# Patient Record
Sex: FEMALE | Race: WHITE | Employment: FULL TIME | URBAN - METROPOLITAN AREA
[De-identification: names, ages, dates, MRNs, and addresses within clinical notes are randomized per-mention and may not be internally consistent; named-entity substitution may affect disease eponyms.]

---

## 2017-01-03 ENCOUNTER — OFFICE VISIT (OUTPATIENT)
Dept: ORTHOPEDIC SURGERY | Age: 42
End: 2017-01-03

## 2017-01-03 VITALS
WEIGHT: 293 LBS | BODY MASS INDEX: 41.02 KG/M2 | DIASTOLIC BLOOD PRESSURE: 93 MMHG | HEART RATE: 87 BPM | HEIGHT: 71 IN | SYSTOLIC BLOOD PRESSURE: 147 MMHG

## 2017-01-03 DIAGNOSIS — M19.019 ACROMIOCLAVICULAR JOINT ARTHRITIS: Primary | ICD-10-CM

## 2017-01-03 PROCEDURE — 99213 OFFICE O/P EST LOW 20 MIN: CPT | Performed by: ORTHOPAEDIC SURGERY

## 2017-01-03 RX ORDER — TRAMADOL HYDROCHLORIDE 50 MG/1
50 TABLET ORAL EVERY 6 HOURS PRN
COMMUNITY
Start: 2016-11-21 | End: 2017-01-19 | Stop reason: HOSPADM

## 2017-01-16 ENCOUNTER — HOSPITAL ENCOUNTER (OUTPATIENT)
Dept: PREADMISSION TESTING | Age: 42
Discharge: HOME | End: 2017-01-16
Attending: ORTHOPAEDIC SURGERY | Admitting: ORTHOPAEDIC SURGERY

## 2017-01-16 VITALS — BODY MASS INDEX: 41.02 KG/M2 | WEIGHT: 293 LBS | HEIGHT: 71 IN

## 2017-01-16 RX ORDER — ZIPRASIDONE HYDROCHLORIDE 80 MG/1
80 CAPSULE ORAL 2 TIMES DAILY WITH MEALS
COMMUNITY
End: 2017-01-16

## 2017-01-16 RX ORDER — HYDROCHLOROTHIAZIDE 25 MG/1
25 TABLET ORAL DAILY
Status: ON HOLD | COMMUNITY
End: 2018-11-01

## 2017-01-16 RX ORDER — CHLORHEXIDINE GLUCONATE 0.12 MG/ML
15 RINSE ORAL 2 TIMES DAILY
Status: CANCELLED | OUTPATIENT
Start: 2017-01-16

## 2017-01-16 RX ORDER — M-VIT,TX,IRON,MINS/CALC/FOLIC 27MG-0.4MG
1 TABLET ORAL DAILY
COMMUNITY

## 2017-01-16 RX ORDER — IBUPROFEN 200 MG
200 TABLET ORAL EVERY 6 HOURS PRN
COMMUNITY
End: 2018-02-23 | Stop reason: SDUPTHER

## 2017-01-16 RX ORDER — LORATADINE 10 MG/1
10 CAPSULE, LIQUID FILLED ORAL DAILY
Status: ON HOLD | COMMUNITY
End: 2021-10-27

## 2017-01-16 RX ORDER — ASPIRIN 81 MG/1
81 TABLET ORAL DAILY
COMMUNITY
End: 2017-01-19

## 2017-01-16 RX ORDER — BUPROPION HYDROCHLORIDE 150 MG/1
150 TABLET ORAL EVERY MORNING
COMMUNITY

## 2017-01-16 RX ORDER — FERROUS SULFATE 325(65) MG
325 TABLET ORAL
COMMUNITY
End: 2017-01-16

## 2017-01-17 ENCOUNTER — TELEPHONE (OUTPATIENT)
Dept: ORTHOPEDIC SURGERY | Age: 42
End: 2017-01-17

## 2017-01-18 RX ORDER — SODIUM CHLORIDE, SODIUM LACTATE, POTASSIUM CHLORIDE, CALCIUM CHLORIDE 600; 310; 30; 20 MG/100ML; MG/100ML; MG/100ML; MG/100ML
INJECTION, SOLUTION INTRAVENOUS CONTINUOUS
Status: DISCONTINUED | OUTPATIENT
Start: 2017-01-18 | End: 2017-01-20 | Stop reason: HOSPADM

## 2017-01-18 RX ORDER — MIDAZOLAM HYDROCHLORIDE 1 MG/ML
2 INJECTION INTRAMUSCULAR; INTRAVENOUS ONCE
Status: COMPLETED | OUTPATIENT
Start: 2017-01-18 | End: 2017-01-19

## 2017-01-18 RX ORDER — FENTANYL CITRATE 50 UG/ML
100 INJECTION, SOLUTION INTRAMUSCULAR; INTRAVENOUS ONCE
Status: COMPLETED | OUTPATIENT
Start: 2017-01-18 | End: 2017-01-19

## 2017-01-18 RX ORDER — ROPIVACAINE HYDROCHLORIDE 5 MG/ML
30 INJECTION, SOLUTION EPIDURAL; INFILTRATION; PERINEURAL ONCE
Status: DISCONTINUED | OUTPATIENT
Start: 2017-01-18 | End: 2017-01-20 | Stop reason: HOSPADM

## 2017-01-19 ENCOUNTER — HOSPITAL ENCOUNTER (OUTPATIENT)
Dept: SURGERY | Age: 42
Discharge: HOME | End: 2017-01-19
Admitting: ORTHOPAEDIC SURGERY

## 2017-01-19 VITALS
HEIGHT: 71 IN | OXYGEN SATURATION: 95 % | SYSTOLIC BLOOD PRESSURE: 117 MMHG | RESPIRATION RATE: 16 BRPM | TEMPERATURE: 97.4 F | HEART RATE: 95 BPM | BODY MASS INDEX: 41.02 KG/M2 | DIASTOLIC BLOOD PRESSURE: 75 MMHG | WEIGHT: 293 LBS

## 2017-01-19 LAB — PREGNANCY, URINE: NEGATIVE

## 2017-01-19 RX ORDER — MIDAZOLAM HYDROCHLORIDE 1 MG/ML
2 INJECTION INTRAMUSCULAR; INTRAVENOUS ONCE
Status: DISCONTINUED | OUTPATIENT
Start: 2017-01-19 | End: 2017-01-20 | Stop reason: HOSPADM

## 2017-01-19 RX ORDER — FENTANYL CITRATE 50 UG/ML
50 INJECTION, SOLUTION INTRAMUSCULAR; INTRAVENOUS EVERY 5 MIN PRN
Status: DISCONTINUED | OUTPATIENT
Start: 2017-01-19 | End: 2017-01-20 | Stop reason: HOSPADM

## 2017-01-19 RX ORDER — CHLORHEXIDINE GLUCONATE 0.12 MG/ML
15 RINSE ORAL 2 TIMES DAILY
Status: DISCONTINUED | OUTPATIENT
Start: 2017-01-19 | End: 2017-01-20 | Stop reason: HOSPADM

## 2017-01-19 RX ORDER — MORPHINE SULFATE 2 MG/ML
2 INJECTION, SOLUTION INTRAMUSCULAR; INTRAVENOUS EVERY 5 MIN PRN
Status: DISCONTINUED | OUTPATIENT
Start: 2017-01-19 | End: 2017-01-20 | Stop reason: HOSPADM

## 2017-01-19 RX ORDER — ONDANSETRON 2 MG/ML
4 INJECTION INTRAMUSCULAR; INTRAVENOUS
Status: COMPLETED | OUTPATIENT
Start: 2017-01-19 | End: 2017-01-19

## 2017-01-19 RX ORDER — LABETALOL HYDROCHLORIDE 5 MG/ML
5 INJECTION, SOLUTION INTRAVENOUS EVERY 10 MIN PRN
Status: DISCONTINUED | OUTPATIENT
Start: 2017-01-19 | End: 2017-01-20 | Stop reason: HOSPADM

## 2017-01-19 RX ORDER — OXYCODONE HYDROCHLORIDE AND ACETAMINOPHEN 5; 325 MG/1; MG/1
1 TABLET ORAL ONCE
Status: COMPLETED | OUTPATIENT
Start: 2017-01-19 | End: 2017-01-19

## 2017-01-19 RX ORDER — MEPERIDINE HYDROCHLORIDE 25 MG/ML
12.5 INJECTION INTRAMUSCULAR; INTRAVENOUS; SUBCUTANEOUS EVERY 5 MIN PRN
Status: DISCONTINUED | OUTPATIENT
Start: 2017-01-19 | End: 2017-01-20 | Stop reason: HOSPADM

## 2017-01-19 RX ADMIN — FENTANYL CITRATE 100 MCG: 50 INJECTION, SOLUTION INTRAMUSCULAR; INTRAVENOUS at 08:39

## 2017-01-19 RX ADMIN — SODIUM CHLORIDE, SODIUM LACTATE, POTASSIUM CHLORIDE, CALCIUM CHLORIDE: 600; 310; 30; 20 INJECTION, SOLUTION INTRAVENOUS at 07:48

## 2017-01-19 RX ADMIN — OXYCODONE HYDROCHLORIDE AND ACETAMINOPHEN 1 TABLET: 5; 325 TABLET ORAL at 14:34

## 2017-01-19 RX ADMIN — MIDAZOLAM HYDROCHLORIDE 2 MG: 1 INJECTION INTRAMUSCULAR; INTRAVENOUS at 08:39

## 2017-01-19 RX ADMIN — ONDANSETRON 4 MG: 2 INJECTION INTRAMUSCULAR; INTRAVENOUS at 14:17

## 2017-01-19 ASSESSMENT — PAIN SCALES - GENERAL
PAINLEVEL_OUTOF10: 4
PAINLEVEL_OUTOF10: 0

## 2017-01-19 ASSESSMENT — PAIN - FUNCTIONAL ASSESSMENT: PAIN_FUNCTIONAL_ASSESSMENT: 0-10

## 2017-01-25 ENCOUNTER — OFFICE VISIT (OUTPATIENT)
Dept: ORTHOPEDIC SURGERY | Age: 42
End: 2017-01-25

## 2017-01-25 ENCOUNTER — HOSPITAL ENCOUNTER (OUTPATIENT)
Dept: PHYSICAL THERAPY | Age: 42
Discharge: HOME | End: 2017-01-31
Admitting: ORTHOPAEDIC SURGERY

## 2017-01-25 VITALS
SYSTOLIC BLOOD PRESSURE: 130 MMHG | WEIGHT: 293 LBS | HEIGHT: 71 IN | DIASTOLIC BLOOD PRESSURE: 80 MMHG | BODY MASS INDEX: 41.02 KG/M2 | HEART RATE: 72 BPM

## 2017-01-25 DIAGNOSIS — Z98.890 S/P SHOULDER SURGERY: ICD-10-CM

## 2017-01-25 DIAGNOSIS — M19.019 ACROMIOCLAVICULAR JOINT ARTHRITIS: Primary | ICD-10-CM

## 2017-01-25 PROCEDURE — 99024 POSTOP FOLLOW-UP VISIT: CPT | Performed by: ORTHOPAEDIC SURGERY

## 2017-01-31 ENCOUNTER — HOSPITAL ENCOUNTER (OUTPATIENT)
Dept: PHYSICAL THERAPY | Age: 42
Discharge: HOME | End: 2017-01-31
Admitting: ORTHOPAEDIC SURGERY

## 2017-02-03 ENCOUNTER — HOSPITAL ENCOUNTER (OUTPATIENT)
Dept: PHYSICAL THERAPY | Age: 42
Discharge: HOME | End: 2017-02-03
Admitting: ORTHOPAEDIC SURGERY

## 2017-02-28 ENCOUNTER — OFFICE VISIT (OUTPATIENT)
Dept: ORTHOPEDIC SURGERY | Age: 42
End: 2017-02-28

## 2017-02-28 VITALS
WEIGHT: 293 LBS | HEIGHT: 71 IN | DIASTOLIC BLOOD PRESSURE: 74 MMHG | SYSTOLIC BLOOD PRESSURE: 122 MMHG | HEART RATE: 76 BPM | BODY MASS INDEX: 41.02 KG/M2

## 2017-02-28 DIAGNOSIS — M19.019 ACROMIOCLAVICULAR JOINT ARTHRITIS: Primary | ICD-10-CM

## 2017-02-28 DIAGNOSIS — M75.42 SUBACROMIAL IMPINGEMENT, LEFT: ICD-10-CM

## 2017-02-28 PROCEDURE — 99024 POSTOP FOLLOW-UP VISIT: CPT | Performed by: ORTHOPAEDIC SURGERY

## 2017-08-01 ENCOUNTER — OFFICE VISIT (OUTPATIENT)
Dept: ORTHOPEDIC SURGERY | Age: 42
End: 2017-08-01

## 2017-08-01 VITALS
HEART RATE: 72 BPM | SYSTOLIC BLOOD PRESSURE: 121 MMHG | DIASTOLIC BLOOD PRESSURE: 80 MMHG | WEIGHT: 293 LBS | HEIGHT: 71 IN | BODY MASS INDEX: 41.02 KG/M2

## 2017-08-01 DIAGNOSIS — M25.511 RIGHT SHOULDER PAIN, UNSPECIFIED CHRONICITY: Primary | ICD-10-CM

## 2017-08-01 DIAGNOSIS — M19.019 ACROMIOCLAVICULAR JOINT ARTHRITIS: ICD-10-CM

## 2017-08-01 PROCEDURE — 20610 DRAIN/INJ JOINT/BURSA W/O US: CPT | Performed by: ORTHOPAEDIC SURGERY

## 2017-08-01 PROCEDURE — 73030 X-RAY EXAM OF SHOULDER: CPT | Performed by: ORTHOPAEDIC SURGERY

## 2017-10-03 ENCOUNTER — OFFICE VISIT (OUTPATIENT)
Dept: ORTHOPEDIC SURGERY | Age: 42
End: 2017-10-03

## 2017-10-03 VITALS
BODY MASS INDEX: 41.95 KG/M2 | DIASTOLIC BLOOD PRESSURE: 80 MMHG | HEART RATE: 78 BPM | HEIGHT: 70 IN | SYSTOLIC BLOOD PRESSURE: 130 MMHG | WEIGHT: 293 LBS

## 2017-10-03 DIAGNOSIS — M19.019 ACROMIOCLAVICULAR JOINT ARTHRITIS: Primary | ICD-10-CM

## 2017-10-03 DIAGNOSIS — M24.519 CONTRACTURE OF SHOULDER JOINT, UNSPECIFIED LATERALITY: ICD-10-CM

## 2017-10-03 PROCEDURE — 20610 DRAIN/INJ JOINT/BURSA W/O US: CPT | Performed by: ORTHOPAEDIC SURGERY

## 2017-10-03 PROCEDURE — 99214 OFFICE O/P EST MOD 30 MIN: CPT | Performed by: ORTHOPAEDIC SURGERY

## 2017-10-03 RX ORDER — ERGOCALCIFEROL 1.25 MG/1
CAPSULE ORAL
Status: ON HOLD | COMMUNITY
Start: 2017-09-30 | End: 2018-11-01

## 2017-10-03 RX ORDER — PAROXETINE 10 MG/1
TABLET, FILM COATED ORAL
COMMUNITY
Start: 2017-09-28

## 2017-10-03 NOTE — PROGRESS NOTES
Cortisone injection: right shoulder      2cc depo-medrol 40mg   Lot: [id number]  Exp: 01/2020  Ndc: 7771-4076-98    4cc lidocaine   Lot: -[id number]  Exp: sept 1 2018  Ndc: 1797-5562-16

## 2017-10-03 NOTE — PROGRESS NOTES
Review of Systems   Respiratory:        Asthma    Musculoskeletal: Positive for joint pain. Right shoulder pain    All other systems reviewed and are negative.

## 2017-10-03 NOTE — MR AVS SNAPSHOT
Medications and Orders      Your Current Medications Are              vitamin D (ERGOCALCIFEROL) 51252 units CAPS capsule     PARoxetine (PAXIL) 10 MG tablet     loratadine (CLARITIN) 10 MG capsule Take 10 mg by mouth daily    EPINEPHrine HCl, Anaphylaxis, (EPIPEN 2-DO IM) Inject into the muscle    Multiple Vitamins-Minerals (THERAPEUTIC MULTIVITAMIN-MINERALS) tablet Take 1 tablet by mouth daily    ibuprofen (ADVIL;MOTRIN) 200 MG tablet Take 200 mg by mouth every 6 hours as needed for Pain    buPROPion (WELLBUTRIN XL) 150 MG extended release tablet Take 150 mg by mouth every morning    hydrochlorothiazide (HYDRODIURIL) 25 MG tablet Take 25 mg by mouth daily    zolpidem (AMBIEN) 10 MG tablet       Allergies              Morphine Itching    Mucomyst [Acetylcysteine] Itching      We Ordered/Performed the following           [facility]     Scheduling Instructions:    [facility]  [place] [address], [address] [facility]  [phone]    Comments: The patient can be scheduled with any member of the group, including the provider with the first available appointments. Additional Information        Basic Information     Date Of Birth Sex Race Ethnicity Preferred Language    1975 Female White Non-/Non  English      Problem List as of 10/3/2017  Date Reviewed: 8/1/2017                Acromioclavicular joint arthritis      Preventive Care        Date Due    HIV screening is recommended for all people regardless of risk factors  aged 15-65 years at least once (lifetime) who have never been HIV tested.  7/23/1988    Tetanus Combination Vaccine (1 - Tdap) 7/23/1992    Pap Smear 7/23/1994    Cholesterol Screening 7/23/2013    Diabetes Screening 7/23/2013    Yearly Flu Vaccine (1) 9/1/2017            Auto[employer] Signup           [email or web address] allows you to send messages to your doctor, view your test results, renew your prescriptions, schedule appointments, view visit notes, and more. How Do I Sign Up? 1. In your Internet browser, go to [email or web address]  2. Click on the Sign Up Now link in the Sign In box. You will see the New Member Sign Up page. 3. Enter your HyTrustt Access Code exactly as it appears below. You will not need to use this code after youve completed the sign-up process. If you do not sign up before the expiration date, you must request a new code. Uniregistry Access Code: [email or web address]  Expires: 12/2/2017  3:06 PM    4. Enter your Social Security Number ([id number]) and Date of Birth (mm/dd/yyyy) as indicated and click Submit. You will be taken to the next sign-up page. 5. Create a Uniregistry ID. This will be your Uniregistry login ID and cannot be changed, so think of one that is secure and easy to remember. 6. Create a Uniregistry password. You can change your password at any time. 7. Enter your Password Reset Question and Answer. This can be used at a later time if you forget your password. 8. Enter your e-mail address. You will receive e-mail notification when new information is available in [address] 9. Click Sign Up. You can now view your medical record. Additional Information  If you have questions, please contact the physician practice where you receive care. Remember, Uniregistry is NOT to be used for urgent needs. For medical emergencies, dial 911. For questions regarding your Uniregistry account call [phone]. If you have a clinical question, please call your doctor's office.

## 2017-10-03 NOTE — LETTER
Patient Name: Myriam Arambula MRN: [id number]  DOS: 10/3/2017   Diagnosis:   1. Acromioclavicular joint arthritis    2. ghird, right                            Goal:  Decrease Pain and/or Swelling Increase ROM and/or Flexibility     Increase Function                           Increase Strength and/or Endurance   Other   Evaluation:  Evaluation and Treatment KT-1000   Isokinetic Exam   Preoperative Eval    Recommended Modalities:  Modalities of Choice      HCVS            Electrical Stimulation      Remove Dressing  Ultrasound        TENS/TNS     Lumbar Traction            Cervical Traction   Phonophoresis         Hot Pack/Cold Pack   PT Treatment, Unlisted Other:  Therapeutic Exercises:    Isometrics    Range of Motion Progressive Exer. Balance Coordination   Flexibility  ROM Limited  Total Hip Replacement   Passive  ROM Full   Total Knee Replacement  Active Assisted    Shoulder Impingement Prog  Active   Tennis Elbow Program   Capsular Shift Regular        Isokinetics      Spine Program   Straight Leg Raises   Gait    Fixation                    Supine                                               Running    Extension    Prone    Throwing    Stabilization    AB     Swiss Ball    AD       Spine Eval    Cervical Eval   Conditioning    Lumbar   Stationary Bike    [place]field Track    Lumbar Exer. Stairmaster          Treadmill    Functional Cap  Aquatic Prog.       Return to work    Treatment Program:  Frequency:  1x   2x   3x   4x   5x week/month  Duration:  1   2   3   4   5 week/month  Weight Bearing:  Non   1/4 1/2   3/4   Full  ROM:  Restricted   Full   Follow established:         Other: posterior capsule stretching

## 2017-10-03 NOTE — PROGRESS NOTES
Masha Gilmore comes in the office for a follow up of her right shoulder. She was given a steroid injection back in august and states that it lasted about 3 weeks and now the pain has returned and is progressively getting worse. She states that it bothers her when her bra strap rub against the top of her shoulders. 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 She denies numbness. She does states that it bothers her to lay at night.  She rates her pain 6/10 today.     - mark garcia joint arthritis, right shoulder     - cortisone injection  - physical therapy   - follow up 1 month

## 2017-10-04 NOTE — PROGRESS NOTES
Patient seen for follow-up evaluation of her right shoulder. She status post a distal clavicle excision on the left. She now has right shoulder pain. She's been diagnosed with a.c. Joint dysfunction as well as rotator cuff tendinitis. She was given a steroid injection and a.c. Joint in August and says it lasted about 3 weeks. She says that her symptoms have recurred and now her pain is getting worse. She has pain primarily over the superior aspect of her shoulder but also posteriorly. She has pain with internal rotation of the shoulder. Pain Assessment  Location of Pain: Shoulder  Location Modifiers: Right  Severity of Pain: 6  Quality of Pain: Aching, Sharp  Duration of Pain: Persistent  Frequency of Pain: Constant  Aggravating Factors:  (bra rubbing against shoulder )  Limiting Behavior: Yes  Relieving Factors: Rest  Result of Injury: No  Work-Related Injury: No  Are there other pain locations you wish to document?: No    Past Medical History:   Diagnosis Date    Arthritis     Asthma due to environmental allergies     B[facility] 2 disorder ([facility] [address]     Environmental allergies     Hypertension     unspecified    PTSD (post-traumatic stress disorder)     Shoulder instability     left        Past Surgical History:   Procedure Laterality Date    CYST REMOVAL      DILATION AND CURETTAGE OF UTERUS      KNEE SURGERY      OTHER SURGICAL HISTORY  01/19/2017    LEFT SHOULDER ARTHROSCOPY LABRAL DEBRIDEMENT SUBACROMIAL    TONSILLECTOMY      WRIST FRACTURE SURGERY         History reviewed. No pertinent family history.     Social History     Social History    Marital status:      Spouse name: N/A    Number of children: N/A    Years of education: N/A     Social History Main Topics    Smoking status: Never Smoker    Smokeless tobacco: Never Used    Alcohol use No    Drug use: None    Sexual activity: Not Asked     Other Topics Concern    None     Social History Narrative       Current Outpatient Prescriptions   Medication Sig Dispense Refill    vitamin D (ERGOCALCIFEROL) 62906 units CAPS capsule       PARoxetine (PAXIL) 10 MG tablet       loratadine (CLARITIN) 10 MG capsule Take 10 mg by mouth daily      EPINEPHrine HCl, Anaphylaxis, (EPIPEN 2-DO IM) Inject into the muscle      Multiple Vitamins-Minerals (THERAPEUTIC MULTIVITAMIN-MINERALS) tablet Take 1 tablet by mouth daily      ibuprofen (ADVIL;MOTRIN) 200 MG tablet Take 200 mg by mouth every 6 hours as needed for Pain      buPROPion (WELLBUTRIN XL) 150 MG extended release tablet Take 150 mg by mouth every morning      hydrochlorothiazide (HYDRODIURIL) 25 MG tablet Take 25 mg by mouth daily      zolpidem (AMBIEN) 10 MG tablet        No current facility-administered medications for this visit. Allergies   Allergen Reactions    Morphine Itching    Mucomyst [Acetylcysteine] Itching       Vital signs:  /80  Pulse 78  Ht 5' 10\" (1.778 m)  Wt (!) 320 lb (145.2 kg)  BMI 45.92 kg/m2       Neuro: Alert & oriented x 3,  normal,  no focal deficits noted. Normal affect. Eyes: sclera clear  Ears: Normal external ear  Mouth:  No perioral lesions  Pulm: Respirations unlabored and regular  Pulse: Regular rate and rhythm   Skin: Warm, well perfused        Right shoulder exam     Examination of the right shoulder shows that the patient holds the arm in a slightly guarded positiont. There is significant limitation of internal and external rotation as well as forward flexion and shoulder abduction. There is mild tenderness over the greater tuberosity. Moderate tenderness over the a.c. Joint. Mild supraspinatus weakness is present. No instability is present. No swelling or erythema is noted. Normal sensation intact. Left Shoulder Examination:    Inspection: No abnormal swelling. No erythema. No induration. Palpation: No tenderness to palpation. No palpable masses. No crepitus.     Acromioclavicular joint: Nontender to is included in the medical record. Daily Fishman MD  Sports Medicine, Knee and Shoulder Surgery    This dictation was performed with a verbal recognition program [facility]) and it was checked for errors. It is possible that there are still dictated errors within this office note. If so, please bring any errors to my attention for an addendum. All efforts were made to ensure that this office note is accurate.

## 2017-10-05 ENCOUNTER — HOSPITAL ENCOUNTER (OUTPATIENT)
Dept: PHYSICAL THERAPY | Age: 42
Discharge: HOME | End: 2017-10-31
Admitting: ORTHOPAEDIC SURGERY

## 2017-10-05 NOTE — FLOWSHEET NOTE
Physical Therapy  Cancellation/No-show Note  Patient Name:  Lindsey Eagle  :  1975   Date:  10/5/2017  Cancelled visits to date: 01  No-shows to date: 0    For today's appointment patient:  [x]  Cancelled  []  Rescheduled appointment  []  No-show     Reason given by patient:  []  Patient ill  []  Conflicting appointment  []  No transportation    []  Conflict with work  [x]  No reason given  []  Other:     Comments:      Electronically signed by:  Carrie Ortega PT

## 2017-11-01 ENCOUNTER — HOSPITAL ENCOUNTER (OUTPATIENT)
Dept: PHYSICAL THERAPY | Age: 42
Discharge: HOME | End: 2017-11-30
Attending: ORTHOPAEDIC SURGERY | Admitting: ORTHOPAEDIC SURGERY

## 2017-12-12 ENCOUNTER — OFFICE VISIT (OUTPATIENT)
Dept: ORTHOPEDIC SURGERY | Age: 42
End: 2017-12-12

## 2017-12-12 VITALS
HEIGHT: 71 IN | BODY MASS INDEX: 41.02 KG/M2 | HEART RATE: 93 BPM | SYSTOLIC BLOOD PRESSURE: 127 MMHG | WEIGHT: 293 LBS | DIASTOLIC BLOOD PRESSURE: 75 MMHG

## 2017-12-12 DIAGNOSIS — S76.311A HAMSTRING MUSCLE STRAIN, RIGHT, INITIAL ENCOUNTER: Primary | ICD-10-CM

## 2017-12-12 PROCEDURE — 99213 OFFICE O/P EST LOW 20 MIN: CPT | Performed by: ORTHOPAEDIC SURGERY

## 2017-12-12 ASSESSMENT — ENCOUNTER SYMPTOMS: SINUS PAIN: 1

## 2017-12-12 NOTE — PROGRESS NOTES
Date:  2017    Name:  Ese Salinas  Address:  [address] [address]    :  1975      Age:   38 y.o.    SSN:  [id number]      Medical Record Number:  [id number]    Reason for Visit:    Chief Complaint    Knee Injury (op/np right knee. pt states that she missed a step 2 weeks ago. )      DOS:2017     HPI: Ese Salinas is a 38 y.o. female here today for evaluation of her right knee. About 2 weeks ago she missed the last 2 steps on her stairwell and came down on her left leg in a hyperextended position. Since then she's been having significant posterior based knee pain. It hurts her any time she fully extends her knee. The knee does not feel unstable. It hasn't buckled on her. She's never injured this knee before. The pain did get so bad that she presented to the emergency room a few days after it happened. They provided her with ibuprofen and Vicodin. These have helped with the pain. Pain Assessment  Location of Pain: Knee  Location Modifiers: Right  Severity of Pain: 4  Quality of Pain: Sharp, Dull, Aching  Duration of Pain: Persistent  Frequency of Pain: Constant  Aggravating Factors: Walking, Straightening (pivoting )  Limiting Behavior: Yes  Relieving Factors: Rest  Result of Injury: Yes  Work-Related Injury: No  Are there other pain locations you wish to document?: No    ROS: A 14 point review of systems available in the scanned medical record as documented by the patient. The review is negative with the exception of those things mentioned in the History of Present Illness and Past Medical History.        Past Medical History:   Diagnosis Date    Arthritis     Asthma due to environmental allergies     B[facility] 2 disorder ([facility] [address]     Depression     Environmental allergies     Hypertension     unspecified    PTSD (post-traumatic stress disorder)     Shoulder instability     left        Past Surgical History:   Procedure Laterality Date    CYST REMOVAL  DILATION AND CURETTAGE OF UTERUS      KNEE SURGERY      LEFT KNEE    KNEE SURGERY      OTHER SURGICAL HISTORY  01/19/2017    LEFT SHOULDER ARTHROSCOPY LABRAL DEBRIDEMENT SUBACROMIAL    TONSILLECTOMY      WRIST FRACTURE SURGERY      WRIST SURGERY      RIGHT GANGLION       History reviewed. No pertinent family history. Social History     Social History    Marital status:      Spouse name: N/A    Number of children: N/A    Years of education: N/A     Social History Main Topics    Smoking status: Never Smoker    Smokeless tobacco: Never Used    Alcohol use No      Comment: VERY RARE    Drug use: No    Sexual activity: Not Asked     Other Topics Concern    None     Social History Narrative    ** Merged History Encounter **            Current Outpatient Prescriptions   Medication Sig Dispense Refill    vitamin D (ERGOCALCIFEROL) 54170 units CAPS capsule       PARoxetine (PAXIL) 10 MG tablet       loratadine (CLARITIN) 10 MG capsule Take 10 mg by mouth daily      EPINEPHrine HCl, Anaphylaxis, (EPIPEN 2-DO IM) Inject into the muscle      Multiple Vitamins-Minerals (THERAPEUTIC MULTIVITAMIN-MINERALS) tablet Take 1 tablet by mouth daily      ibuprofen (ADVIL;MOTRIN) 200 MG tablet Take 200 mg by mouth every 6 hours as needed for Pain      buPROPion (WELLBUTRIN XL) 150 MG extended release tablet Take 150 mg by mouth every morning      hydrochlorothiazide (HYDRODIURIL) 25 MG tablet Take 25 mg by mouth daily      zolpidem (AMBIEN) 10 MG tablet       propranolol (INDERAL) 10 MG tablet Take 10 mg by mouth 3 times daily.  ziprasidone (GEODON) 40 MG capsule Take 40 mg by mouth 2 times daily (with meals).  zolpidem (AMBIEN) 10 MG tablet Take 10 mg by mouth nightly as needed.  Loratadine-Pseudoephedrine (LORATADINE-D 24HR PO) Take  by mouth. No current facility-administered medications for this visit.         Allergies   Allergen Reactions    Morphine Itching    fossa. No pain with patellar grind testing. Non-tender to the medial or lateral patellar facets. Non-tender to medial and lateral collateral ligaments. No significant Patellofemoral crepitus. Calf compartments are soft and non-tender. No signs of DVT.      Range of Motion: From 0 to 120 degrees of flexion without pain. Internal and external rotation of the hip are maintained without pain or deficit.      Strength: 5/5 strength of the quadriceps and hamstrings.      Ligamentous Stability: Stable to valgus and varus stress testing at 0° and 30°. Raheel's does not reproduce pain. Lachman's has a solid endpoint.     Neurologic and vascular: Intact sensation to light touch in all 5 digits. 2+ palpable pedal pulses. Diagnostics:  Radiology:     No new x-rays were obtained today    Assessment: 40-year-old female status post hyperextension right knee injury leading to hamstring strain    Encounter Diagnosis   Name Primary?  Hamstring muscle strain, right, initial encounter Yes         Plan: Diagnoses and treatments were reviewed with the patient today. Patient education material was provided. Questions were entertained and answered to the patient's satisfaction. Recommended formal physical therapy for stretching and pain and swelling control. Return to gradual normal range of motion and function. Anti-inflammatories and Tylenol for pain and swelling control. Avoid narcotics. Follow-up in 4-6 weeks if she is noticing no improvement with physical therapy. Felix Castro MD  Clinical Fellow in [address] [place] [address] [facility] Certified Orthopaedic Surgeon  [address] [place] [address]    Date:    12/12/2017    This dictation was performed with a verbal recognition program [facility]) and it was checked for errors. It is possible that there are still dictated errors within this office note. If so, please bring any errors to my attention for an addendum.   All efforts were made to ensure that this office note is accurate. I attest that my visit today with Ghazal Lynch was supervised by Dr Ga Quevedo.

## 2018-02-23 ENCOUNTER — OFFICE VISIT (OUTPATIENT)
Dept: ORTHOPEDIC SURGERY | Age: 43
End: 2018-02-23

## 2018-02-23 VITALS
HEIGHT: 71 IN | WEIGHT: 293 LBS | HEART RATE: 78 BPM | DIASTOLIC BLOOD PRESSURE: 80 MMHG | BODY MASS INDEX: 41.02 KG/M2 | SYSTOLIC BLOOD PRESSURE: 133 MMHG

## 2018-02-23 DIAGNOSIS — S83.241D TEAR OF MEDIAL MENISCUS OF RIGHT KNEE, UNSPECIFIED TEAR TYPE, UNSPECIFIED WHETHER OLD OR CURRENT TEAR, SUBSEQUENT ENCOUNTER: Primary | ICD-10-CM

## 2018-02-23 DIAGNOSIS — M25.561 RIGHT KNEE PAIN, UNSPECIFIED CHRONICITY: ICD-10-CM

## 2018-02-23 PROCEDURE — 99214 OFFICE O/P EST MOD 30 MIN: CPT | Performed by: ORTHOPAEDIC SURGERY

## 2018-02-23 RX ORDER — IBUPROFEN 800 MG/1
800 TABLET ORAL EVERY 8 HOURS PRN
Qty: 90 TABLET | Refills: 2 | Status: ON HOLD | OUTPATIENT
Start: 2018-02-23 | End: 2018-11-01 | Stop reason: HOSPADM

## 2018-02-23 ASSESSMENT — ENCOUNTER SYMPTOMS: SINUS PAIN: 1

## 2018-02-23 NOTE — PROGRESS NOTES
Patient seen in follow-up evaluation right knee. She reports that she is been doing rehab but continues to have pain she says is present over the medial aspect of her knee. Associated with twisting turning movements. When her knee gets sore she has difficulty bending and she has tightness in the posterior aspect of her knee. She doesn't feel like her therapy's help. Symptoms are really with rest.  Worse with activities. Pain Assessment  Location of Pain: Knee  Location Modifiers: Right  Severity of Pain: 6  Quality of Pain: Sharp, Aching  Duration of Pain: Persistent  Frequency of Pain: Constant  Aggravating Factors: Walking (laying it flat in bed at night, quick movements)  Limiting Behavior: Yes  Relieving Factors:  (nothing right now)  Result of Injury: Yes  Work-Related Injury: No  Are there other pain locations you wish to document?: No    Past Medical History:   Diagnosis Date    Arthritis     Asthma due to environmental allergies     [facility] disorder ([facility] [address]     Depression     Environmental allergies     Hypertension     unspecified    PTSD (post-traumatic stress disorder)     Shoulder instability     left        Past Surgical History:   Procedure Laterality Date    CYST REMOVAL      DILATION AND CURETTAGE OF UTERUS      KNEE SURGERY      LEFT KNEE    KNEE SURGERY      OTHER SURGICAL HISTORY  01/19/2017    LEFT SHOULDER ARTHROSCOPY LABRAL DEBRIDEMENT SUBACROMIAL    TONSILLECTOMY      WRIST FRACTURE SURGERY      WRIST SURGERY      RIGHT GANGLION       No family history on file.     Social History     Social History    Marital status:      Spouse name: N/A    Number of children: N/A    Years of education: N/A     Social History Main Topics    Smoking status: Never Smoker    Smokeless tobacco: Never Used    Alcohol use No      Comment: VERY RARE    Drug use: No    Sexual activity: Not Asked     Other Topics Concern    None     Social History Narrative    ** Merged History Encounter **            Current Outpatient Prescriptions   Medication Sig Dispense Refill    ibuprofen (ADVIL;MOTRIN) 800 MG tablet Take 1 tablet by mouth every 8 hours as needed for Pain 90 tablet 2    vitamin D (ERGOCALCIFEROL) 33076 units CAPS capsule       PARoxetine (PAXIL) 10 MG tablet       loratadine (CLARITIN) 10 MG capsule Take 10 mg by mouth daily      EPINEPHrine HCl, Anaphylaxis, (EPIPEN 2-DO IM) Inject into the muscle      Multiple Vitamins-Minerals (THERAPEUTIC MULTIVITAMIN-MINERALS) tablet Take 1 tablet by mouth daily      buPROPion (WELLBUTRIN XL) 150 MG extended release tablet Take 150 mg by mouth every morning      hydrochlorothiazide (HYDRODIURIL) 25 MG tablet Take 25 mg by mouth daily      zolpidem (AMBIEN) 10 MG tablet       propranolol (INDERAL) 10 MG tablet Take 10 mg by mouth 3 times daily.  ziprasidone (GEODON) 40 MG capsule Take 40 mg by mouth 2 times daily (with meals).  zolpidem (AMBIEN) 10 MG tablet Take 10 mg by mouth nightly as needed.  Loratadine-Pseudoephedrine (LORATADINE-D 24HR PO) Take  by mouth. No current facility-administered medications for this visit. Allergies   Allergen Reactions    Morphine Itching    Mucomyst [Acetylcysteine] Itching    Morphine Itching    Mucomyst [Acetylcysteine] Itching       Vital signs:  /80   Pulse 78   Ht 5' 11\" (1.803 m)   Wt (!) 330 lb (149.7 kg)   BMI 46.03 kg/m²        Neuro: Alert & oriented x 3,  normal,  no focal deficits noted. Normal affect. Eyes: sclera clear  Ears: Normal external ear  Mouth:  No perioral lesions  Pulm: Respirations unlabored and regular  Pulse: Regular rate and rhythm   Skin: Warm, well perfused          Right Knee Exam:         Gait/Alignment: No use of assistive devices. No antalgic gait or limp. Normal alignment. Patella tracking: Normal tracking identified. No retinacular tenderness. Negative J sign.       Inspection/Skin: Skin is intact

## 2018-03-13 ENCOUNTER — OFFICE VISIT (OUTPATIENT)
Dept: ORTHOPEDIC SURGERY | Age: 43
End: 2018-03-13

## 2018-03-13 VITALS
HEART RATE: 74 BPM | BODY MASS INDEX: 41.02 KG/M2 | WEIGHT: 293 LBS | DIASTOLIC BLOOD PRESSURE: 76 MMHG | SYSTOLIC BLOOD PRESSURE: 135 MMHG | HEIGHT: 71 IN

## 2018-03-13 DIAGNOSIS — M17.10 PATELLOFEMORAL ARTHRITIS: Primary | ICD-10-CM

## 2018-03-13 DIAGNOSIS — S83.241A OTHER TEAR OF MEDIAL MENISCUS OF RIGHT KNEE AS CURRENT INJURY, INITIAL ENCOUNTER: ICD-10-CM

## 2018-03-13 PROCEDURE — 99214 OFFICE O/P EST MOD 30 MIN: CPT | Performed by: ORTHOPAEDIC SURGERY

## 2018-03-13 ASSESSMENT — ENCOUNTER SYMPTOMS: SINUS PAIN: 1

## 2018-03-13 NOTE — PROGRESS NOTES
Patient comes in the office for a mri follow up of her right knee. Her films shows that she have patellofemoral arthritis along with a medial meniscus tear. She reports that her symptoms have gotten a little better, but states that she has had a couple of episodes of where she felt her knee was going to give out on her. She states that she continues to take the ibuprofen and notes that it helps. She is still actively doing her exercises. Her pain occurred back in December when she fell down a couple of stairs.        - continue with the anti-inflammatory prn   - follow up prn

## 2018-03-13 NOTE — PROGRESS NOTES
Review of Systems   HENT: Positive for nosebleeds and sinus pain. Cardiovascular: Positive for leg swelling. Musculoskeletal: Positive for joint pain. Right knee pain    Psychiatric/Behavioral: Positive for depression. All other systems reviewed and are negative.

## 2018-03-21 NOTE — PROGRESS NOTES
Follow-up evaluation right knee. Patient is still from arthritis in the medial meniscus tear. She reports her symptoms are somewhat improved she continues to have had episodes which feels like it is giving out on her. This is worse with stairs as well as with twisting turning movements. She takes ibuprofen does feel like this helps. She is doing her exercises. Her symptoms initially developed when she fell down some stairs in December but she has not had any new injuries. Pain Assessment  Location of Pain: Knee  Location Modifiers: Right  Severity of Pain: 2  Quality of Pain: Aching  Duration of Pain: A few minutes  Frequency of Pain: Intermittent  Aggravating Factors:  (certain movement )  Limiting Behavior: Yes  Relieving Factors: Rest  Result of Injury: Yes (fell down stairs )  Work-Related Injury: No  Are there other pain locations you wish to document?: No    Past Medical History:   Diagnosis Date    Arthritis     Asthma due to environmental allergies     Bipolar 2 disorder ([facility] [address]     Depression     Environmental allergies     Hypertension     unspecified    PTSD (post-traumatic stress disorder)     Shoulder instability     left        Past Surgical History:   Procedure Laterality Date    CYST REMOVAL      DILATION AND CURETTAGE OF UTERUS      KNEE SURGERY      LEFT KNEE    KNEE SURGERY      OTHER SURGICAL HISTORY  01/19/2017    LEFT SHOULDER ARTHROSCOPY LABRAL DEBRIDEMENT SUBACROMIAL    TONSILLECTOMY      WRIST FRACTURE SURGERY      WRIST SURGERY      RIGHT GANGLION       History reviewed. No pertinent family history.     Social History     Social History    Marital status:      Spouse name: N/A    Number of children: N/A    Years of education: N/A     Social History Main Topics    Smoking status: Never Smoker    Smokeless tobacco: Never Used    Alcohol use No      Comment: VERY RARE    Drug use: No    Sexual activity: Not Asked     Other Topics Concern    None     Social tenderness to palpation about the joint. There is a negative Raheel sign. The Lachman sign is negative. The anterior and posterior drawer signs are negative. The cruciate and collateral ligaments are intact. The patient has a negative hyperflexion test.  No bursal swelling is present. There is mild crepitus. There is no pain with compression of the patella. The patella apprehension sign is negative. Q angles are within normal limits. There is no pretibial edema. Pulses are symmetric. Sensation is intact to light-touch. Left Knee Exam:       Gait/Alignment: No use of assistive devices. No antalgic gait or limp. Normal alignment. Patella tracking: Normal tracking identified. No retinacular tenderness. Negative J sign. Inspection/Skin: Skin is intact without erythema, ecchymosis, or swelling. Effusion; None. Palpation:  Nontender. No crepitus. Range of Motion:   0° of extension to 125° of flexion. Strength: 5/5 quadriceps strength. Ligamentous Stability: Stable to valgus and varus testing at 0° and 30°. Negative Lachman exam.  Negative posterior drawer. Neurologic and vascular: Skin is warm and well-perfused. Additional findings: Calf soft nontender             Impression: Patellofemoral arthritis immune meniscus tear right knee. Loss still symptomatic the patient believes her current level symptoms are tolerable. She is continue with home exercises. She'll also continue anti-inflammatory medications as needed. She is cognizant of side effects of medication is what to look out for. She is going to follow with me if her symptoms become more limiting for her. Greater than 50% of the visit was spent counseling the patient. I personally reviewed the patient's pain scale, review of systems, family history, social history, past medical history, allergies and medications.   13 point review of systems was collected today and is included in the medical record. Jey Griffith MD  Sports Medicine, Knee and Shoulder Surgery    This dictation was performed with a verbal recognition program [facility]) and it was checked for errors. It is possible that there are still dictated errors within this office note. If so, please bring any errors to my attention for an addendum. All efforts were made to ensure that this office note is accurate.

## 2018-05-31 DIAGNOSIS — M17.11 PRIMARY OSTEOARTHRITIS OF RIGHT KNEE: Primary | ICD-10-CM

## 2018-05-31 DIAGNOSIS — S83.241A TEAR OF MEDIAL MENISCUS OF RIGHT KNEE, CURRENT, UNSPECIFIED TEAR TYPE, INITIAL ENCOUNTER: ICD-10-CM

## 2018-05-31 RX ORDER — IBUPROFEN 800 MG/1
TABLET ORAL
Qty: 90 TABLET | Refills: 2 | Status: SHIPPED | OUTPATIENT
Start: 2018-05-31 | End: 2018-09-30 | Stop reason: SDUPTHER

## 2018-09-28 ENCOUNTER — OFFICE VISIT (OUTPATIENT)
Dept: ORTHOPEDIC SURGERY | Age: 43
End: 2018-09-28
Payer: COMMERCIAL

## 2018-09-28 VITALS
HEART RATE: 73 BPM | DIASTOLIC BLOOD PRESSURE: 76 MMHG | BODY MASS INDEX: 41.02 KG/M2 | SYSTOLIC BLOOD PRESSURE: 130 MMHG | HEIGHT: 71 IN | WEIGHT: 293 LBS

## 2018-09-28 DIAGNOSIS — M17.11 PRIMARY OSTEOARTHRITIS OF RIGHT KNEE: ICD-10-CM

## 2018-09-28 DIAGNOSIS — S83.241D TEAR OF MEDIAL MENISCUS OF RIGHT KNEE, CURRENT, UNSPECIFIED TEAR TYPE, SUBSEQUENT ENCOUNTER: ICD-10-CM

## 2018-09-28 DIAGNOSIS — M17.11 OSTEOARTHRITIS OF RIGHT PATELLOFEMORAL JOINT: Primary | ICD-10-CM

## 2018-09-28 PROCEDURE — 99214 OFFICE O/P EST MOD 30 MIN: CPT | Performed by: ORTHOPAEDIC SURGERY

## 2018-09-28 PROCEDURE — 20610 DRAIN/INJ JOINT/BURSA W/O US: CPT | Performed by: ORTHOPAEDIC SURGERY

## 2018-09-28 ASSESSMENT — ENCOUNTER SYMPTOMS
SORE THROAT: 1
SINUS PAIN: 1

## 2018-09-28 NOTE — PROGRESS NOTES
Special tests: Negative Raheel sign. Patella apprehension sign negative. Neurologic and vascular: Skin is warm and well-perfused. Distally neurovascularly intact. Additional findings: Calf soft nontender. Sensation is intact to light-touch. No pretibial edema. Comparison LEFT Knee Examination:    Gait: No use of assistive devices. No antalgic gait. Alignment: Alignment appreciated. Inspection/skin: Quadriceps well developed. Skin is intact without erythema or ecchymosis. No gross deformity. Palpation: Crepitus. Nontender along joint line. No pain with compression of patella. Nontender to light touch. Range of Motion: Full ROM. Strength: 5/5 quad strength    Effusion: No apparent effusion. Ligamentous stability: Stable to valgus and varus stress at 0° and 30°. Solid endpoint with Lachman's. Negative posterior and anterior drawer signs. Patella tracking: Smooth translation of patella. Special tests: Negative Raheel sign. Patella apprehension sign negative. Neurologic and vascular: Skin is warm and well-perfused. Distally neurovascularly intact. Additional findings: Calf soft nontender. Sensation is intact to light-touch. No pretibial edema. Radiology:     No new XR obtained today. MRI from 2/28/2018 of right knee re-reviewed:  CONCLUSION:    1. Meniscal root tear of the posterior horn of the medial meniscus with associated medial    extrusion of the body of the meniscus. 2. Patellofemoral osteoarthritis with high-grade chondromalacia involving both the lateral    patellar facet and the inferior aspect of the lateral trochlea with underlying subchondral bone    marrow reaction and mild spurring. 3. Moderate-grade chondromalacia within the medial compartment involving the posterior aspect    of the weightbearing medial femoral condyle. 4. Small 4mm hypointense intraarticular loose body within the posteromedial aspect of the    intercondylar notch. 5. Large knee effusion. 6. No evidence of lateral meniscal tear or ligamentous tear. Assessment : 35yo female with patellofemoral osteoarthritis and medial meniscus tear. The causation of her discomfort is from the patellofemoral arthritis. Impression:  Encounter Diagnoses   Name Primary?  Primary osteoarthritis of right knee     Tear of medial meniscus of right knee, current, unspecified tear type, subsequent encounter     Osteoarthritis of right patellofemoral joint Yes       Office Procedures:  No orders of the defined types were placed in this encounter. Plan: The nature and natural history of osteoarthritis was discussed in detail the patient today. Treatment options both surgical and nonsurgical were discussed in detail. Patient was counseled with regard to the importance of activity modification as well as weight control. The role for medications, intra-articular injections as well as surgery were discussed. Patient's questions were answered.     Believe patient is a candidate for a right knee intraarticular cortisone injection today followed by continuation of home exercises. Patient reported relief post injection. Followup in 4 weeks and/or as needed. If she's doing well she can cancel her follow up appointment. Peng Casarez is in agreement with this plan. All questions were answered to patient's satisfaction and was encouraged to call with any further questions. The indications and risks of steroid injection as well as treatment alternatives were discussed with the patient who consented to the procedure. Under sterile conditions and after informed consent was obtained the patient was given an injection into the RIGHT knee. 40 mg of Depo-Medrol and 4 mL of 0.5% ropivacaine were placed in the knee after it was prepped with chlorhexidine. This resulted in good relief of symptoms. There were no complications.  The patient was advised to ice the knee this evening and

## 2018-09-29 NOTE — PROGRESS NOTES
Special tests: Negative Raheel sign. Patella apprehension sign negative. Neurologic and vascular: Skin is warm and well-perfused. Distally neurovascularly intact. Additional findings: Calf soft nontender. Sensation is intact to light-touch. No pretibial edema. Comparison LEFT Knee Examination:    Gait: No use of assistive devices. No antalgic gait. Alignment: Alignment appreciated. Inspection/skin: Quadriceps well developed. Skin is intact without erythema or ecchymosis. No gross deformity. Palpation: Crepitus. Nontender along joint line. No pain with compression of patella. Nontender to light touch. Range of Motion: Full ROM. Strength: 5/5 quad strength    Effusion: No apparent effusion. Ligamentous stability: Stable to valgus and varus stress at 0° and 30°. Solid endpoint with Lachman's. Negative posterior and anterior drawer signs. Patella tracking: Smooth translation of patella. Special tests: Negative Raheel sign. Patella apprehension sign negative. Neurologic and vascular: Skin is warm and well-perfused. Distally neurovascularly intact. Additional findings: Calf soft nontender. Sensation is intact to light-touch. No pretibial edema. Radiology:     No new XR obtained today. MRI from 2/28/2018 of right knee re-reviewed:  CONCLUSION:    1. Meniscal root tear of the posterior horn of the medial meniscus with associated medial    extrusion of the body of the meniscus. 2. Patellofemoral osteoarthritis with high-grade chondromalacia involving both the lateral    patellar facet and the inferior aspect of the lateral trochlea with underlying subchondral bone    marrow reaction and mild spurring. 3. Moderate-grade chondromalacia within the medial compartment involving the posterior aspect    of the weightbearing medial femoral condyle. 4. Small 4mm hypointense intraarticular loose body within the posteromedial aspect of the    intercondylar notch. 5. Large knee effusion. 6. No evidence of lateral meniscal tear or ligamentous tear. Assessment : 35yo female with patellofemoral osteoarthritis and medial meniscus tear. The causation of her discomfort is from the patellofemoral arthritis. Impression:  Encounter Diagnoses   Name Primary?  Primary osteoarthritis of right knee     Tear of medial meniscus of right knee, current, unspecified tear type, subsequent encounter     Osteoarthritis of right patellofemoral joint Yes       Office Procedures:  No orders of the defined types were placed in this encounter. Plan: The nature and natural history of osteoarthritis was discussed in detail the patient today. Treatment options both surgical and nonsurgical were discussed in detail. Patient was counseled with regard to the importance of activity modification as well as weight control. The role for medications, intra-articular injections as well as surgery were discussed. Patient's questions were answered.     Believe patient is a candidate for a right knee intraarticular cortisone injection today followed by continuation of home exercises. Patient reported relief post injection. Followup in 4 weeks and/or as needed. If she's doing well she can cancel her follow up appointment. Sridhar Gamboa is in agreement with this plan. All questions were answered to patient's satisfaction and was encouraged to call with any further questions. The indications and risks of steroid injection as well as treatment alternatives were discussed with the patient who consented to the procedure. Under sterile conditions and after informed consent was obtained the patient was given an injection into the RIGHT knee. 40 mg of Depo-Medrol and 4 mL of 0.5% ropivacaine were placed in the knee after it was prepped with chlorhexidine. This resulted in good relief of symptoms. There were no complications.  The patient was advised to ice the knee this evening and

## 2018-09-30 DIAGNOSIS — M17.11 PRIMARY OSTEOARTHRITIS OF RIGHT KNEE: ICD-10-CM

## 2018-09-30 DIAGNOSIS — S83.241A TEAR OF MEDIAL MENISCUS OF RIGHT KNEE, CURRENT, UNSPECIFIED TEAR TYPE, INITIAL ENCOUNTER: ICD-10-CM

## 2018-10-01 RX ORDER — IBUPROFEN 800 MG/1
TABLET ORAL
Qty: 90 TABLET | Refills: 1 | Status: SHIPPED | OUTPATIENT
Start: 2018-10-01 | End: 2018-12-06 | Stop reason: SDUPTHER

## 2018-10-10 ENCOUNTER — TELEPHONE (OUTPATIENT)
Dept: ORTHOPEDIC SURGERY | Age: 43
End: 2018-10-10

## 2018-10-19 ENCOUNTER — OFFICE VISIT (OUTPATIENT)
Dept: ORTHOPEDIC SURGERY | Age: 43
End: 2018-10-19
Payer: COMMERCIAL

## 2018-10-19 VITALS
BODY MASS INDEX: 41.02 KG/M2 | HEART RATE: 81 BPM | DIASTOLIC BLOOD PRESSURE: 89 MMHG | WEIGHT: 293 LBS | SYSTOLIC BLOOD PRESSURE: 132 MMHG | HEIGHT: 71 IN

## 2018-10-19 DIAGNOSIS — M17.11 PRIMARY OSTEOARTHRITIS OF RIGHT KNEE: Primary | ICD-10-CM

## 2018-10-19 DIAGNOSIS — S83.241A TEAR OF MEDIAL MENISCUS OF RIGHT KNEE, CURRENT, UNSPECIFIED TEAR TYPE, INITIAL ENCOUNTER: ICD-10-CM

## 2018-10-19 PROCEDURE — 99214 OFFICE O/P EST MOD 30 MIN: CPT | Performed by: ORTHOPAEDIC SURGERY

## 2018-10-19 ASSESSMENT — ENCOUNTER SYMPTOMS
SORE THROAT: 1
SINUS PAIN: 1

## 2018-10-19 NOTE — PROGRESS NOTES
Review of Systems   HENT: Positive for sinus pain and sore throat. Musculoskeletal:        Right knee pain    Neurological: Positive for headaches. Psychiatric/Behavioral: Positive for depression. All other systems reviewed and are negative.
ROM.    Strength: 5/5 quad strength    Effusion: No apparent effusion. Ligamentous stability: Stable to valgus and varus stress at 0° and 30°. Solid endpoint with Lachman's. Negative posterior and anterior drawer signs. Patella tracking: Smooth translation of patella. Special tests: Negative Raheel sign. Patella apprehension sign negative. Neurologic and vascular: Skin is warm and well-perfused. Distally neurovascularly intact. Additional findings: Calf soft nontender. Sensation is intact to light-touch. No pretibial edema. Comparison LEFT Knee Examination:    Gait: No use of assistive devices. No antalgic gait. Alignment: Alignment appreciated. Inspection/skin: Quadriceps well developed. Skin is intact without erythema or ecchymosis. No gross deformity. Palpation: No crepitus. Nontender along joint line. No pain with compression of patella. Nontender to light touch. Range of Motion: Full ROM. Strength: 5/5 quad strength    Effusion: No apparent effusion. Ligamentous stability: Stable to valgus and varus stress at 0° and 30°. Solid endpoint with Lachman's. Negative posterior and anterior drawer signs. Patella tracking: Smooth translation of patella. Special tests: Negative Raheel sign. Patella apprehension sign negative. Neurologic and vascular: Skin is warm and well-perfused. Distally neurovascularly intact. Additional findings: Calf soft nontender. Sensation is intact to light-touch. No pretibial edema. Radiology:     No new XR obtained today. Assessment :  35yo female with right knee osteoarthritis and medial meniscus tear. Impression:  Encounter Diagnoses   Name Primary?  Primary osteoarthritis of right knee Yes    Tear of medial meniscus of right knee, current, unspecified tear type, initial encounter        Office Procedures:  No orders of the defined types were placed in this encounter. Plan:  We had a long discussion

## 2018-10-26 ENCOUNTER — TELEPHONE (OUTPATIENT)
Dept: ORTHOPEDIC SURGERY | Age: 43
End: 2018-10-26

## 2018-10-30 NOTE — PROGRESS NOTES
[facility] / [facility] ([facility]) [address] [facility], [address]    Acknowledgment of Informed Consent for Surgical or Medical Procedure and Sedation  I agree to allow doctor(s) Kofi Santiago and his/her associates or assistants, including residents and/or other qualified medical practitioner to perform the following medical treatment or procedure and to administer or direct the administration of sedation as necessary:  Procedure(s): RIGHT KNEE ARTHROSCOPY, MEDIAL MENISCUS EXCISION VERSUS  REPAIR Aliza Black  My doctor has explained the following regarding the proposed procedure:   the explanation of the procedure   the benefits of the procedure   the potential problems that might occur during recuperation   the risks and side effects of the procedure which could include but are not limited to severe blood loss, infection, stroke or death   the benefits, risks and side effect of alternative procedures including the consequences of declining this procedure or any alternative procedures   the likelihood of achieving satisfactory results. I acknowledge no guarantee or assurance has been made to me regarding the results. I understand that during the course of this treatment/procedure, unforeseen conditions can occur which require an additional or different procedure. I agree to allow my physician or assistants to perform such extension of the original procedure as they may find necessary. I understand that sedation will often result in temporary impairment of memory and fine motor skills and that sedation can occasionally progress to a state of deep sedation or general anesthesia. I understand the risks of anesthesia for surgery include, but are not limited to, sore throat, hoarseness, injury to face, mouth, or teeth; nausea; headache; injury to blood vessels or nerves; death, brain damage, or paralysis.     I understand that if I have a Limitation of Treatment order in

## 2018-10-31 ENCOUNTER — ANESTHESIA EVENT (OUTPATIENT)
Dept: OPERATING ROOM | Age: 43
End: 2018-10-31
Payer: COMMERCIAL

## 2018-11-01 ENCOUNTER — ANESTHESIA (OUTPATIENT)
Dept: OPERATING ROOM | Age: 43
End: 2018-11-01
Payer: COMMERCIAL

## 2018-11-01 ENCOUNTER — HOSPITAL ENCOUNTER (OUTPATIENT)
Age: 43
Setting detail: OUTPATIENT SURGERY
Discharge: HOME | End: 2018-11-01
Attending: ORTHOPAEDIC SURGERY | Admitting: ORTHOPAEDIC SURGERY
Payer: COMMERCIAL

## 2018-11-01 VITALS
TEMPERATURE: 97.5 F | OXYGEN SATURATION: 95 % | HEART RATE: 62 BPM | SYSTOLIC BLOOD PRESSURE: 117 MMHG | RESPIRATION RATE: 16 BRPM | HEIGHT: 71 IN | BODY MASS INDEX: 41.02 KG/M2 | WEIGHT: 293 LBS | DIASTOLIC BLOOD PRESSURE: 76 MMHG

## 2018-11-01 VITALS — DIASTOLIC BLOOD PRESSURE: 85 MMHG | OXYGEN SATURATION: 94 % | TEMPERATURE: 96.4 F | SYSTOLIC BLOOD PRESSURE: 163 MMHG

## 2018-11-01 LAB — PREGNANCY, URINE: NEGATIVE

## 2018-11-01 PROCEDURE — 2709999900 HC NON-CHARGEABLE SUPPLY: Performed by: ORTHOPAEDIC SURGERY

## 2018-11-01 PROCEDURE — 6360000002 HC RX W HCPCS: Performed by: ORTHOPAEDIC SURGERY

## 2018-11-01 PROCEDURE — 7100000001 HC PACU RECOVERY - ADDTL 15 MIN: Performed by: ORTHOPAEDIC SURGERY

## 2018-11-01 PROCEDURE — 2580000003 HC RX 258: Performed by: ANESTHESIOLOGY

## 2018-11-01 PROCEDURE — 6360000002 HC RX W HCPCS: Performed by: ANESTHESIOLOGY

## 2018-11-01 PROCEDURE — 6360000002 HC RX W HCPCS

## 2018-11-01 PROCEDURE — 3600000014 HC SURGERY LEVEL 4 ADDTL 15MIN: Performed by: ORTHOPAEDIC SURGERY

## 2018-11-01 PROCEDURE — 6360000002 HC RX W HCPCS: Performed by: NURSE ANESTHETIST, CERTIFIED REGISTERED

## 2018-11-01 PROCEDURE — 2580000003 HC RX 258: Performed by: ORTHOPAEDIC SURGERY

## 2018-11-01 PROCEDURE — 3700000001 HC ADD 15 MINUTES (ANESTHESIA): Performed by: ORTHOPAEDIC SURGERY

## 2018-11-01 PROCEDURE — 2720000010 HC SURG SUPPLY STERILE: Performed by: ORTHOPAEDIC SURGERY

## 2018-11-01 PROCEDURE — 3700000000 HC ANESTHESIA ATTENDED CARE: Performed by: ORTHOPAEDIC SURGERY

## 2018-11-01 PROCEDURE — 7100000010 HC PHASE II RECOVERY - FIRST 15 MIN: Performed by: ORTHOPAEDIC SURGERY

## 2018-11-01 PROCEDURE — 7100000011 HC PHASE II RECOVERY - ADDTL 15 MIN: Performed by: ORTHOPAEDIC SURGERY

## 2018-11-01 PROCEDURE — 7100000000 HC PACU RECOVERY - FIRST 15 MIN: Performed by: ORTHOPAEDIC SURGERY

## 2018-11-01 PROCEDURE — 3600000004 HC SURGERY LEVEL 4 BASE: Performed by: ORTHOPAEDIC SURGERY

## 2018-11-01 PROCEDURE — 6370000000 HC RX 637 (ALT 250 FOR IP): Performed by: ANESTHESIOLOGY

## 2018-11-01 PROCEDURE — 84703 CHORIONIC GONADOTROPIN ASSAY: CPT

## 2018-11-01 PROCEDURE — 2500000003 HC RX 250 WO HCPCS: Performed by: NURSE ANESTHETIST, CERTIFIED REGISTERED

## 2018-11-01 RX ORDER — OXYCODONE HYDROCHLORIDE AND ACETAMINOPHEN 5; 325 MG/1; MG/1
2 TABLET ORAL PRN
Status: COMPLETED | OUTPATIENT
Start: 2018-11-01 | End: 2018-11-01

## 2018-11-01 RX ORDER — ONDANSETRON 2 MG/ML
INJECTION INTRAMUSCULAR; INTRAVENOUS PRN
Status: DISCONTINUED | OUTPATIENT
Start: 2018-11-01 | End: 2018-11-01 | Stop reason: SDUPTHER

## 2018-11-01 RX ORDER — FENTANYL CITRATE 50 UG/ML
25 INJECTION, SOLUTION INTRAMUSCULAR; INTRAVENOUS EVERY 5 MIN PRN
Status: DISCONTINUED | OUTPATIENT
Start: 2018-11-01 | End: 2018-11-01 | Stop reason: HOSPADM

## 2018-11-01 RX ORDER — PROMETHAZINE HYDROCHLORIDE 25 MG/ML
INJECTION, SOLUTION INTRAMUSCULAR; INTRAVENOUS
Status: DISCONTINUED
Start: 2018-11-01 | End: 2018-11-01 | Stop reason: HOSPADM

## 2018-11-01 RX ORDER — FENTANYL CITRATE 50 UG/ML
INJECTION, SOLUTION INTRAMUSCULAR; INTRAVENOUS PRN
Status: DISCONTINUED | OUTPATIENT
Start: 2018-11-01 | End: 2018-11-01 | Stop reason: SDUPTHER

## 2018-11-01 RX ORDER — PROPOFOL 10 MG/ML
INJECTION, EMULSION INTRAVENOUS PRN
Status: DISCONTINUED | OUTPATIENT
Start: 2018-11-01 | End: 2018-11-01 | Stop reason: SDUPTHER

## 2018-11-01 RX ORDER — SODIUM CHLORIDE, SODIUM LACTATE, POTASSIUM CHLORIDE, AND CALCIUM CHLORIDE .6; .31; .03; .02 G/100ML; G/100ML; G/100ML; G/100ML
IRRIGANT IRRIGATION PRN
Status: DISCONTINUED | OUTPATIENT
Start: 2018-11-01 | End: 2018-11-01 | Stop reason: HOSPADM

## 2018-11-01 RX ORDER — ONDANSETRON 2 MG/ML
4 INJECTION INTRAMUSCULAR; INTRAVENOUS
Status: COMPLETED | OUTPATIENT
Start: 2018-11-01 | End: 2018-11-01

## 2018-11-01 RX ORDER — SODIUM CHLORIDE, SODIUM LACTATE, POTASSIUM CHLORIDE, CALCIUM CHLORIDE 600; 310; 30; 20 MG/100ML; MG/100ML; MG/100ML; MG/100ML
INJECTION, SOLUTION INTRAVENOUS CONTINUOUS
Status: DISCONTINUED | OUTPATIENT
Start: 2018-11-01 | End: 2018-11-01 | Stop reason: HOSPADM

## 2018-11-01 RX ORDER — OXYCODONE HYDROCHLORIDE AND ACETAMINOPHEN 5; 325 MG/1; MG/1
1 TABLET ORAL PRN
Status: COMPLETED | OUTPATIENT
Start: 2018-11-01 | End: 2018-11-01

## 2018-11-01 RX ORDER — ROPIVACAINE HYDROCHLORIDE 5 MG/ML
INJECTION, SOLUTION EPIDURAL; INFILTRATION; PERINEURAL PRN
Status: DISCONTINUED | OUTPATIENT
Start: 2018-11-01 | End: 2018-11-01 | Stop reason: HOSPADM

## 2018-11-01 RX ORDER — HYDRALAZINE HYDROCHLORIDE 20 MG/ML
5 INJECTION INTRAMUSCULAR; INTRAVENOUS EVERY 10 MIN PRN
Status: DISCONTINUED | OUTPATIENT
Start: 2018-11-01 | End: 2018-11-01 | Stop reason: HOSPADM

## 2018-11-01 RX ORDER — SUCCINYLCHOLINE/SOD CL,ISO/PF 100 MG/5ML
SYRINGE (ML) INTRAVENOUS PRN
Status: DISCONTINUED | OUTPATIENT
Start: 2018-11-01 | End: 2018-11-01 | Stop reason: SDUPTHER

## 2018-11-01 RX ORDER — FENTANYL CITRATE 50 UG/ML
50 INJECTION, SOLUTION INTRAMUSCULAR; INTRAVENOUS EVERY 5 MIN PRN
Status: DISCONTINUED | OUTPATIENT
Start: 2018-11-01 | End: 2018-11-01 | Stop reason: HOSPADM

## 2018-11-01 RX ORDER — LABETALOL HYDROCHLORIDE 5 MG/ML
5 INJECTION, SOLUTION INTRAVENOUS EVERY 10 MIN PRN
Status: DISCONTINUED | OUTPATIENT
Start: 2018-11-01 | End: 2018-11-01 | Stop reason: HOSPADM

## 2018-11-01 RX ORDER — QUETIAPINE FUMARATE 25 MG/1
50 TABLET, FILM COATED ORAL DAILY
COMMUNITY

## 2018-11-01 RX ORDER — LIDOCAINE HYDROCHLORIDE 20 MG/ML
INJECTION, SOLUTION EPIDURAL; INFILTRATION; INTRACAUDAL; PERINEURAL PRN
Status: DISCONTINUED | OUTPATIENT
Start: 2018-11-01 | End: 2018-11-01 | Stop reason: SDUPTHER

## 2018-11-01 RX ORDER — ONDANSETRON 2 MG/ML
INJECTION INTRAMUSCULAR; INTRAVENOUS
Status: COMPLETED
Start: 2018-11-01 | End: 2018-11-01

## 2018-11-01 RX ORDER — PROMETHAZINE HYDROCHLORIDE 25 MG/ML
6.25 INJECTION, SOLUTION INTRAMUSCULAR; INTRAVENOUS EVERY 10 MIN PRN
Status: DISCONTINUED | OUTPATIENT
Start: 2018-11-01 | End: 2018-11-01 | Stop reason: HOSPADM

## 2018-11-01 RX ORDER — DEXAMETHASONE SODIUM PHOSPHATE 4 MG/ML
INJECTION, SOLUTION INTRA-ARTICULAR; INTRALESIONAL; INTRAMUSCULAR; INTRAVENOUS; SOFT TISSUE PRN
Status: DISCONTINUED | OUTPATIENT
Start: 2018-11-01 | End: 2018-11-01 | Stop reason: SDUPTHER

## 2018-11-01 RX ADMIN — PROPOFOL 100 MG: 10 INJECTION, EMULSION INTRAVENOUS at 11:10

## 2018-11-01 RX ADMIN — ONDANSETRON 4 MG: 2 INJECTION INTRAMUSCULAR; INTRAVENOUS at 11:48

## 2018-11-01 RX ADMIN — HYDROMORPHONE HYDROCHLORIDE 0.25 MG: 1 INJECTION, SOLUTION INTRAMUSCULAR; INTRAVENOUS; SUBCUTANEOUS at 13:52

## 2018-11-01 RX ADMIN — FENTANYL CITRATE 100 MCG: 50 INJECTION INTRAMUSCULAR; INTRAVENOUS at 11:05

## 2018-11-01 RX ADMIN — PROPOFOL 300 MG: 10 INJECTION, EMULSION INTRAVENOUS at 11:05

## 2018-11-01 RX ADMIN — PROPOFOL 50 MG: 10 INJECTION, EMULSION INTRAVENOUS at 12:07

## 2018-11-01 RX ADMIN — LIDOCAINE HYDROCHLORIDE 100 MG: 20 INJECTION, SOLUTION EPIDURAL; INFILTRATION; INTRACAUDAL; PERINEURAL at 11:05

## 2018-11-01 RX ADMIN — PROPOFOL 50 MG: 10 INJECTION, EMULSION INTRAVENOUS at 12:11

## 2018-11-01 RX ADMIN — FENTANYL CITRATE 50 MCG: 50 INJECTION INTRAMUSCULAR; INTRAVENOUS at 12:05

## 2018-11-01 RX ADMIN — OXYCODONE AND ACETAMINOPHEN 1 TABLET: 5; 325 TABLET ORAL at 15:03

## 2018-11-01 RX ADMIN — DEXAMETHASONE SODIUM PHOSPHATE 4 MG: 4 INJECTION, SOLUTION INTRAMUSCULAR; INTRAVENOUS at 11:48

## 2018-11-01 RX ADMIN — ONDANSETRON 4 MG: 2 INJECTION INTRAMUSCULAR; INTRAVENOUS at 13:10

## 2018-11-01 RX ADMIN — FENTANYL CITRATE 50 MCG: 50 INJECTION INTRAMUSCULAR; INTRAVENOUS at 11:36

## 2018-11-01 RX ADMIN — SODIUM CHLORIDE, SODIUM LACTATE, POTASSIUM CHLORIDE, AND CALCIUM CHLORIDE: 600; 310; 30; 20 INJECTION, SOLUTION INTRAVENOUS at 10:57

## 2018-11-01 RX ADMIN — CEFAZOLIN SODIUM 3 G: 1 POWDER, FOR SOLUTION INTRAMUSCULAR; INTRAVENOUS at 11:11

## 2018-11-01 RX ADMIN — Medication 160 MG: at 11:05

## 2018-11-01 ASSESSMENT — PULMONARY FUNCTION TESTS
PIF_VALUE: 3
PIF_VALUE: 26
PIF_VALUE: 27
PIF_VALUE: 26
PIF_VALUE: 27
PIF_VALUE: 2
PIF_VALUE: 27
PIF_VALUE: 19
PIF_VALUE: 26
PIF_VALUE: 1
PIF_VALUE: 1
PIF_VALUE: 27
PIF_VALUE: 2
PIF_VALUE: 27
PIF_VALUE: 0
PIF_VALUE: 27
PIF_VALUE: 0
PIF_VALUE: 27
PIF_VALUE: 1
PIF_VALUE: 27
PIF_VALUE: 27
PIF_VALUE: 19
PIF_VALUE: 18
PIF_VALUE: 26
PIF_VALUE: 6
PIF_VALUE: 26
PIF_VALUE: 30
PIF_VALUE: 26
PIF_VALUE: 2
PIF_VALUE: 27
PIF_VALUE: 17
PIF_VALUE: 1
PIF_VALUE: 1
PIF_VALUE: 26
PIF_VALUE: 4
PIF_VALUE: 25
PIF_VALUE: 27
PIF_VALUE: 60
PIF_VALUE: 27
PIF_VALUE: 0
PIF_VALUE: 27
PIF_VALUE: 19
PIF_VALUE: 27
PIF_VALUE: 3
PIF_VALUE: 1
PIF_VALUE: 26
PIF_VALUE: 36
PIF_VALUE: 27
PIF_VALUE: 28
PIF_VALUE: 2
PIF_VALUE: 27
PIF_VALUE: 27
PIF_VALUE: 22
PIF_VALUE: 27
PIF_VALUE: 2
PIF_VALUE: 27
PIF_VALUE: 8
PIF_VALUE: 27
PIF_VALUE: 24
PIF_VALUE: 23
PIF_VALUE: 27
PIF_VALUE: 67
PIF_VALUE: 26

## 2018-11-01 ASSESSMENT — LIFESTYLE VARIABLES: SMOKING_STATUS: 0

## 2018-11-01 ASSESSMENT — PAIN SCALES - GENERAL
PAINLEVEL_OUTOF10: 5
PAINLEVEL_OUTOF10: 4
PAINLEVEL_OUTOF10: 5

## 2018-11-01 ASSESSMENT — PAIN DESCRIPTION - ORIENTATION: ORIENTATION: RIGHT

## 2018-11-01 ASSESSMENT — PAIN DESCRIPTION - LOCATION: LOCATION: KNEE

## 2018-11-01 ASSESSMENT — PAIN - FUNCTIONAL ASSESSMENT: PAIN_FUNCTIONAL_ASSESSMENT: 0-10

## 2018-11-01 ASSESSMENT — PAIN DESCRIPTION - DESCRIPTORS: DESCRIPTORS: THROBBING

## 2018-11-01 ASSESSMENT — PAIN DESCRIPTION - PAIN TYPE: TYPE: SURGICAL PAIN

## 2018-11-01 ASSESSMENT — ENCOUNTER SYMPTOMS: SHORTNESS OF BREATH: 0

## 2018-11-01 NOTE — ANESTHESIA POSTPROCEDURE EVALUATION
Department of Anesthesiology  Postprocedure Note    Patient: Katlin Cordon  MRN: [id number]  YOB: 1975  Date of evaluation: 11/1/2018  Time:  2:41 PM     Procedure Summary     Date:  11/01/18 [facility] / [facility]  [facility]  / [facility]    Anesthesia Start:  9477 Anesthesia Stop:  2874    Procedure:  RIGHT KNEE ARTHROSCOPY, MEDIAL MENISCECTOMY, SYNOVECTOMY, CHONDROPLASTY (Right ) Diagnosis:  (MEDIAL MENISCUS TEAR RIGHT KNEE)    Surgeon:  Meagan Mcclellan MD Responsible Provider:  Miguelina Tee MD    Anesthesia Type:  general ASA Status:  3          Anesthesia Type: general    Forest Phase I: Forest Score: 10    Forest Phase II:      Last vitals: Reviewed and per EMR flowsheets.        Anesthesia Post Evaluation    Patient location during evaluation: PACU  Level of consciousness: awake and alert  Airway patency: patent  Nausea & Vomiting: no vomiting  Complications: no  Cardiovascular status: blood pressure returned to baseline  Respiratory status: acceptable  Hydration status: euvolemic

## 2018-11-01 NOTE — ANESTHESIA PRE PROCEDURE
 ceFAZolin (ANCEF) 3 g in dextrose 5 % 100 mL IVPB  3 g Intravenous Once Meagan Mcclellan MD           Allergies:     Allergies   Allergen Reactions    Morphine Itching    Mucomyst [Acetylcysteine] Itching    Morphine Itching    Mucomyst [Acetylcysteine] Itching       Problem List:    Patient Active Problem List   Diagnosis Code    Acetaminophen overdose T39.1X1A    Suicide attempt ([facility] [address] T14.91XA    Depression F32.9    Acromioclavicular joint arthritis M19.019    Tear of medial meniscus of right knee, current S83.241A    Primary osteoarthritis of right knee M17.11       Past Medical History:        Diagnosis Date    Arthritis     Asthma due to environmental allergies     Bipolar 2 disorder ([facility] [address]     Depression     Environmental allergies     Hypertension     unspecified    Primary osteoarthritis of right knee 5/31/2018    PTSD (post-traumatic stress disorder)     Shoulder instability     left       Past Surgical History:        Procedure Laterality Date    CYST REMOVAL      DILATION AND CURETTAGE OF UTERUS      KNEE SURGERY      LEFT KNEE    KNEE SURGERY      OTHER SURGICAL HISTORY  01/19/2017    LEFT SHOULDER ARTHROSCOPY LABRAL DEBRIDEMENT SUBACROMIAL    TONSILLECTOMY      WRIST FRACTURE SURGERY      WRIST SURGERY      RIGHT GANGLION       Social History:    Social History   Substance Use Topics    Smoking status: Never Smoker    Smokeless tobacco: Never Used    Alcohol use No      Comment: VERY RARE                                Counseling given: Not Answered      Vital Signs (Current):   Vitals:    11/01/18 0937   BP: 123/80   Pulse: 77   Resp: 18   Temp: 97.3 °F (36.3 °C)   TempSrc: Oral   SpO2: 95%   Weight: (!) 330 lb (149.7 kg)   Height: 5' 11\" (1.803 m)                                              BP Readings from Last 3 Encounters:   11/01/18 123/80   10/19/18 132/89   09/28/18 130/76       NPO Status: Time of last liquid consumption: 2300                        Time of last

## 2018-11-02 ENCOUNTER — HOSPITAL ENCOUNTER (OUTPATIENT)
Dept: PHYSICAL THERAPY | Age: 43
Setting detail: THERAPIES SERIES
Discharge: HOME | End: 2018-11-02
Payer: COMMERCIAL

## 2018-11-02 ENCOUNTER — OFFICE VISIT (OUTPATIENT)
Dept: ORTHOPEDIC SURGERY | Age: 43
End: 2018-11-02

## 2018-11-02 VITALS
BODY MASS INDEX: 41.02 KG/M2 | SYSTOLIC BLOOD PRESSURE: 116 MMHG | HEIGHT: 71 IN | WEIGHT: 293 LBS | HEART RATE: 55 BPM | DIASTOLIC BLOOD PRESSURE: 90 MMHG

## 2018-11-02 DIAGNOSIS — Z98.890 S/P MEDIAL MENISCECTOMY OF RIGHT KNEE: Primary | ICD-10-CM

## 2018-11-02 DIAGNOSIS — S83.241D TEAR OF MEDIAL MENISCUS OF RIGHT KNEE, CURRENT, UNSPECIFIED TEAR TYPE, SUBSEQUENT ENCOUNTER: ICD-10-CM

## 2018-11-02 DIAGNOSIS — M65.9 SYNOVITIS: ICD-10-CM

## 2018-11-02 PROCEDURE — G8979 MOBILITY GOAL STATUS: HCPCS

## 2018-11-02 PROCEDURE — 97110 THERAPEUTIC EXERCISES: CPT

## 2018-11-02 PROCEDURE — 97016 VASOPNEUMATIC DEVICE THERAPY: CPT

## 2018-11-02 PROCEDURE — G8978 MOBILITY CURRENT STATUS: HCPCS

## 2018-11-02 PROCEDURE — 97161 PT EVAL LOW COMPLEX 20 MIN: CPT

## 2018-11-02 PROCEDURE — 99024 POSTOP FOLLOW-UP VISIT: CPT | Performed by: PHYSICIAN ASSISTANT

## 2018-11-02 ASSESSMENT — ENCOUNTER SYMPTOMS
SINUS PAIN: 1
SORE THROAT: 1

## 2018-11-02 NOTE — FLOWSHEET NOTE
Hamstring - EOT 5x30\"    Supine Hang     Exercise/NMR     Supine Quad Sets 15x5\"    Seated Quad Set (Bug Squish)     SLRs 1. Flexion - 3x10  2. ABd -   3. ADd -  4. Ext -   5. SLR Hold -     Clamshells     Bridges 1. DL -   2. [address] -   3. SL Eccentric -   4. SL -     Knee Ext - EOT     Knee Flex - Standing     Wall Sits     Step Ups 1. Fwd -   2. Lat -     Lateral Step Downs     Lateral Toe Taps     Resisted Lateral Walking     Squat     Lunges 1. Fwd -  2. Diagonal -   3. Lateral -     Ham Curls on Stability Ball     SL RDL     Balance - Tandem 1. EO, ground -   2. EC, ground -   3. EO, airex -   4. EC, airex -     Balance - SLS 1. EO, ground -   2. EC, ground -   3. EO, airex -   4. EC, airex -     Leg Press 1. DL -   2. SL -     Quantum Quad Ext 1. DL -   2. SL -     Quantum Ham Curl 1. DL -   2. SL -     Manual Treatment     Patellar Mob 1. Sup -   2. Inf -   3. Medial -     Knee Ext Mob     Tibial IR      Flex w/ Tibial IR                          Therapeutic Exercise and NMR EXR  [x] (61233) Provided verbal/tactile cueing for activities related to strengthening, flexibility, endurance, ROM for improvements in LE, proximal hip, and core control with self care, mobility, lifting, ambulation.  [] (11085) Provided verbal/tactile cueing for activities related to improving balance, coordination, kinesthetic sense, posture, motor skill, proprioception  to assist with LE, proximal hip, and core control in self care, mobility, lifting, ambulation and eccentric single leg control.      NMR and Therapeutic Activities:    [] (66366 or 12874) Provided verbal/tactile cueing for activities related to improving balance, coordination, kinesthetic sense, posture, motor skill, proprioception and motor activation to allow for proper function of core, proximal hip and LE with self care and ADLs  [] (86068) Gait Re-education- Provided training and instruction to the patient for proper LE, core and proximal hip recruitment and

## 2018-11-02 NOTE — PLAN OF CARE
face-to-face)  [] EVAL (HIGH) 56829 (typically 45 minutes face-to-face)  [] RE-EVAL       PLAN  Frequency/Duration:  1-2 days per week for 6-8 Weeks:  Interventions:  [x]  Therapeutic exercise including: strength training, ROM, for Lower extremity and core   [x]  NMR activation and proprioception for LE, Glutes and Core   [x]  Manual therapy as indicated for LE, Hip and spine to include: Dry Needling/IASTM, STM, PROM, Gr I-IV mobilizations, manipulation. [x] Modalities as needed that may include: thermal agents, E-stim, Biofeedback, US, iontophoresis as indicated  [x] Patient education on joint protection, postural re-education, activity modification, progression of HEP. HEP instruction: (see scanned forms)    GOALS:  Patient stated goal: no pain     Therapist goals for Patient:   Short Term Goals: To be achieved in: 2 weeks  1. Independent in HEP and progression per patient tolerance, in order to prevent re-injury. 2. Patient will have a decrease in pain to facilitate improvement in movement, function, and ADLs as indicated by Functional Deficits. Long Term Goals: To be achieved in: 6-8 weeks  1. Disability index score of 20% or less for the LEFS to assist with reaching prior level of function. 2. Patient will demonstrate increased AROM to 0-120 to allow for proper joint functioning as indicated by patients Functional Deficits. 3. Patient will demonstrate an increase in Strength to 5/5 proximal hip strength and control in LE to allow for proper functional mobility as indicated by patients Functional Deficits. 4. Patient will return to functional activities without increased symptoms or restriction.         Electronically signed by:  Enma Rabago, PT

## 2018-11-20 ENCOUNTER — OFFICE VISIT (OUTPATIENT)
Dept: ORTHOPEDIC SURGERY | Age: 43
End: 2018-11-20

## 2018-11-20 VITALS
HEIGHT: 71 IN | HEART RATE: 81 BPM | BODY MASS INDEX: 41.02 KG/M2 | DIASTOLIC BLOOD PRESSURE: 83 MMHG | WEIGHT: 293 LBS | SYSTOLIC BLOOD PRESSURE: 134 MMHG

## 2018-11-20 DIAGNOSIS — Z98.890 S/P MEDIAL MENISCECTOMY OF RIGHT KNEE: Primary | ICD-10-CM

## 2018-11-20 DIAGNOSIS — M17.11 OSTEOARTHRITIS OF RIGHT KNEE, UNSPECIFIED OSTEOARTHRITIS TYPE: ICD-10-CM

## 2018-11-20 PROCEDURE — 99024 POSTOP FOLLOW-UP VISIT: CPT | Performed by: ORTHOPAEDIC SURGERY

## 2018-11-20 NOTE — PROGRESS NOTES
Chief Complaint  Follow-up (right knee pain. s/p 2.5 weeks partial medial menisectomy )      History of Present Illness:  Clement Yo is a pleasant 37 y.o. female who is 2.5 weeks out from a right partial medial menisectomy. She is doing well at this time and has no issues. She feels she is making good progress as well. Her symptoms are much better now than they were before surgery. Pain Assessment  Location of Pain: Knee  Location Modifiers: Right  Severity of Pain: 0  Quality of Pain: Aching  Duration of Pain: A few minutes  Frequency of Pain: Rarely  Aggravating Factors:  (no aggravating factors )  Limiting Behavior: Yes  Relieving Factors: Rest  Result of Injury: No  Work-Related Injury: No  Are there other pain locations you wish to document?: No    Past Medical History:   Diagnosis Date    Arthritis     Asthma due to environmental allergies     Bipolar 2 disorder ([facility] [address]     Depression     Environmental allergies     Hypertension     unspecified    Primary osteoarthritis of right knee 5/31/2018    PTSD (post-traumatic stress disorder)     Shoulder instability     left        Past Surgical History:   Procedure Laterality Date    CYST REMOVAL      DILATION AND CURETTAGE OF UTERUS      KNEE SURGERY      LEFT KNEE    KNEE SURGERY      OTHER SURGICAL HISTORY  01/19/2017    LEFT SHOULDER ARTHROSCOPY LABRAL DEBRIDEMENT SUBACROMIAL    MT KNEE SCOPE,MED OR LAT MENIS REPAIR Right 11/1/2018    RIGHT KNEE ARTHROSCOPY, MEDIAL MENISCECTOMY, SYNOVECTOMY, CHONDROPLASTY performed by Antony Li MD at [address] [place] [address]       History reviewed. No pertinent family history.     Social History     Social History    Marital status:      Spouse name: N/A    Number of children: N/A    Years of education: N/A     Social History Main Topics    Smoking status: Never Smoker    Smokeless tobacco: Never Used    Alcohol use No

## 2018-12-06 DIAGNOSIS — M17.11 PRIMARY OSTEOARTHRITIS OF RIGHT KNEE: ICD-10-CM

## 2018-12-06 DIAGNOSIS — S83.241A TEAR OF MEDIAL MENISCUS OF RIGHT KNEE, CURRENT, UNSPECIFIED TEAR TYPE, INITIAL ENCOUNTER: ICD-10-CM

## 2018-12-07 RX ORDER — IBUPROFEN 800 MG/1
TABLET ORAL
Qty: 90 TABLET | Refills: 0 | Status: SHIPPED | OUTPATIENT
Start: 2018-12-07 | End: 2018-12-27 | Stop reason: SDUPTHER

## 2018-12-27 DIAGNOSIS — M17.11 PRIMARY OSTEOARTHRITIS OF RIGHT KNEE: ICD-10-CM

## 2018-12-27 DIAGNOSIS — S83.241A TEAR OF MEDIAL MENISCUS OF RIGHT KNEE, CURRENT, UNSPECIFIED TEAR TYPE, INITIAL ENCOUNTER: ICD-10-CM

## 2018-12-27 RX ORDER — IBUPROFEN 800 MG/1
TABLET ORAL
Qty: 90 TABLET | Refills: 1 | Status: SHIPPED | OUTPATIENT
Start: 2018-12-27

## 2019-06-04 ENCOUNTER — HOSPITAL ENCOUNTER (OUTPATIENT)
Dept: NEUROLOGY | Age: 44
Discharge: HOME | End: 2019-06-04
Payer: COMMERCIAL

## 2019-06-04 ENCOUNTER — HOSPITAL ENCOUNTER (OUTPATIENT)
Age: 44
Discharge: HOME | End: 2019-06-04
Payer: COMMERCIAL

## 2019-06-04 LAB
ANION GAP SERPL CALCULATED.3IONS-SCNC: 14 MMOL/L (ref 3–16)
BASOPHILS ABSOLUTE: 0 K/UL (ref 0–0.2)
BASOPHILS RELATIVE PERCENT: 0.3 %
BUN BLDV-MCNC: 17 MG/DL (ref 7–20)
C-REACTIVE PROTEIN: 5.9 MG/L (ref 0–5.1)
CALCIUM SERPL-MCNC: 9.6 MG/DL (ref 8.3–10.6)
CHLORIDE BLD-SCNC: 107 MMOL/L (ref 99–110)
CO2: 23 MMOL/L (ref 21–32)
CREAT SERPL-MCNC: 0.7 MG/DL (ref 0.6–1.1)
EOSINOPHILS ABSOLUTE: 0 K/UL (ref 0–0.6)
EOSINOPHILS RELATIVE PERCENT: 0.1 %
GFR AFRICAN AMERICAN: >60
GFR NON-AFRICAN AMERICAN: >60
GLUCOSE BLD-MCNC: 108 MG/DL (ref 70–99)
HCT VFR BLD CALC: 40 % (ref 36–48)
HEMOGLOBIN: 13.1 G/DL (ref 12–16)
LYMPHOCYTES ABSOLUTE: 1.9 K/UL (ref 1–5.1)
LYMPHOCYTES RELATIVE PERCENT: 20.1 %
MCH RBC QN AUTO: 27.6 PG (ref 26–34)
MCHC RBC AUTO-ENTMCNC: 32.8 G/DL (ref 31–36)
MCV RBC AUTO: 84.1 FL (ref 80–100)
MONOCYTES ABSOLUTE: 0.4 K/UL (ref 0–1.3)
MONOCYTES RELATIVE PERCENT: 4.3 %
NEUTROPHILS ABSOLUTE: 7.2 K/UL (ref 1.7–7.7)
NEUTROPHILS RELATIVE PERCENT: 75.2 %
PDW BLD-RTO: 14.1 % (ref 12.4–15.4)
PLATELET # BLD: 203 K/UL (ref 135–450)
PMV BLD AUTO: 11.1 FL (ref 5–10.5)
POTASSIUM SERPL-SCNC: 4.3 MMOL/L (ref 3.5–5.1)
RBC # BLD: 4.75 M/UL (ref 4–5.2)
RHEUMATOID FACTOR: <10 IU/ML
SEDIMENTATION RATE, ERYTHROCYTE: 16 MM/HR (ref 0–20)
SODIUM BLD-SCNC: 144 MMOL/L (ref 136–145)
VITAMIN B-12: >2000 PG/ML (ref 211–911)
WBC # BLD: 9.6 K/UL (ref 4–11)

## 2019-06-04 PROCEDURE — 95861 NEEDLE EMG 2 EXTREMITIES: CPT

## 2019-06-04 PROCEDURE — 82607 VITAMIN B-12: CPT

## 2019-06-04 PROCEDURE — 36415 COLL VENOUS BLD VENIPUNCTURE: CPT

## 2019-06-04 PROCEDURE — 86039 ANTINUCLEAR ANTIBODIES (ANA): CPT

## 2019-06-04 PROCEDURE — 85652 RBC SED RATE AUTOMATED: CPT

## 2019-06-04 PROCEDURE — 95886 MUSC TEST DONE W/N TEST COMP: CPT | Performed by: PSYCHIATRY & NEUROLOGY

## 2019-06-04 PROCEDURE — 86431 RHEUMATOID FACTOR QUANT: CPT

## 2019-06-04 PROCEDURE — 85025 COMPLETE CBC W/AUTO DIFF WBC: CPT

## 2019-06-04 PROCEDURE — 80048 BASIC METABOLIC PNL TOTAL CA: CPT

## 2019-06-04 PROCEDURE — 95909 NRV CNDJ TST 5-6 STUDIES: CPT | Performed by: PSYCHIATRY & NEUROLOGY

## 2019-06-04 PROCEDURE — 86140 C-REACTIVE PROTEIN: CPT

## 2019-06-04 PROCEDURE — 95909 NRV CNDJ TST 5-6 STUDIES: CPT

## 2019-06-04 PROCEDURE — 86038 ANTINUCLEAR ANTIBODIES: CPT

## 2019-06-04 NOTE — PROCEDURES
[facility]                     [facility], [address]                             ELECTROMYOGRAM REPORT    PATIENT NAME: Jackelin Rutherford                    :        1975  MED REC NO:   [id number]                          ROOM:  ACCOUNT NO:   [de-identified]                           ADMIT DATE: 2019  PROVIDER:     Jose Angel Null MD    DATE OF EM2019    REASON FOR EMG:  Tingling and numbness in both feet and legs, rule out  peripheral neuropathy versus radiculopathy. SUMMARY:  Bilateral peroneal motor nerve studies were normal.  The left  posterior tibial motor nerve study was normal.  The right posterior  tibial motor had a borderline conduction velocity, which is probably a  nonspecific finding. Bilateral sural sensory studies were normal.   Needle EMG of several muscles in both lower extremities including the  lumbar paraspinal muscles was normal.    EMG DIAGNOSIS:  This is a normal EMG and nerve conduction study of both  lower extremities. The right posterior tibial motor had a borderline  slow conduction velocity, which is probably a nonspecific finding. However, a very early and very mild peripheral neuropathy cannot be  totally ruled out. If clinical symptoms persist, a repeat study after  six months maybe useful for comparison.         Domingo Rebolledo MD    D: 2019 9:27:55       T: 2019 9:37:34     [id number]  Job#: [id number]     Doc#: [id number]    CC:

## 2019-06-05 LAB
ANA INTERPRETATION: ABNORMAL
ANA PATTERN: ABNORMAL
ANA TITER: ABNORMAL
ANTI-NUCLEAR ANTIBODY (ANA): POSITIVE
PATHOLOGIST: ABNORMAL

## 2019-12-12 ENCOUNTER — OFFICE VISIT (OUTPATIENT)
Dept: ORTHOPEDIC SURGERY | Age: 44
End: 2019-12-12
Payer: COMMERCIAL

## 2019-12-12 VITALS
SYSTOLIC BLOOD PRESSURE: 111 MMHG | BODY MASS INDEX: 39.06 KG/M2 | HEART RATE: 56 BPM | DIASTOLIC BLOOD PRESSURE: 69 MMHG | WEIGHT: 279 LBS | HEIGHT: 71 IN

## 2019-12-12 DIAGNOSIS — M48.062 SPINAL STENOSIS OF LUMBAR REGION WITH NEUROGENIC CLAUDICATION: ICD-10-CM

## 2019-12-12 DIAGNOSIS — M51.36 DDD (DEGENERATIVE DISC DISEASE), LUMBAR: ICD-10-CM

## 2019-12-12 DIAGNOSIS — M54.17 RADICULITIS, LUMBOSACRAL: ICD-10-CM

## 2019-12-12 DIAGNOSIS — M54.50 LUMBAR PAIN: Primary | ICD-10-CM

## 2019-12-12 PROCEDURE — 99203 OFFICE O/P NEW LOW 30 MIN: CPT | Performed by: INTERNAL MEDICINE

## 2019-12-12 RX ORDER — GABAPENTIN 300 MG/1
300 CAPSULE ORAL NIGHTLY
Qty: 30 CAPSULE | Refills: 0 | Status: SHIPPED | OUTPATIENT
Start: 2019-12-12 | End: 2019-12-30

## 2019-12-12 RX ORDER — MELOXICAM 15 MG/1
15 TABLET ORAL DAILY
Qty: 30 TABLET | Refills: 2 | Status: SHIPPED | OUTPATIENT
Start: 2019-12-12

## 2019-12-30 ENCOUNTER — OFFICE VISIT (OUTPATIENT)
Dept: ORTHOPEDIC SURGERY | Age: 44
End: 2019-12-30
Payer: COMMERCIAL

## 2019-12-30 VITALS — HEIGHT: 71 IN | WEIGHT: 279.1 LBS | BODY MASS INDEX: 39.07 KG/M2

## 2019-12-30 DIAGNOSIS — M51.36 DDD (DEGENERATIVE DISC DISEASE), LUMBAR: Primary | ICD-10-CM

## 2019-12-30 DIAGNOSIS — M47.817 LUMBOSACRAL SPONDYLOSIS WITHOUT MYELOPATHY: ICD-10-CM

## 2019-12-30 DIAGNOSIS — G60.9 IDIOPATHIC PERIPHERAL NEUROPATHY: ICD-10-CM

## 2019-12-30 PROCEDURE — 99214 OFFICE O/P EST MOD 30 MIN: CPT | Performed by: INTERNAL MEDICINE

## 2019-12-30 RX ORDER — CYCLOBENZAPRINE HCL 10 MG
10 TABLET ORAL NIGHTLY PRN
Qty: 30 TABLET | Refills: 1 | Status: ON HOLD | OUTPATIENT
Start: 2019-12-30 | End: 2021-10-27

## 2019-12-30 RX ORDER — GABAPENTIN 300 MG/1
300 CAPSULE ORAL 2 TIMES DAILY
Qty: 60 CAPSULE | Refills: 1 | Status: ON HOLD | OUTPATIENT
Start: 2019-12-30 | End: 2021-10-27 | Stop reason: HOSPADM

## 2020-01-15 ENCOUNTER — HOSPITAL ENCOUNTER (OUTPATIENT)
Dept: PHYSICAL THERAPY | Age: 45
Setting detail: THERAPIES SERIES
Discharge: HOME | End: 2020-01-15
Payer: COMMERCIAL

## 2021-10-22 NOTE — PROGRESS NOTES
and request that the above-named physician, his/her assistants or his/her designees, perform procedures as necessary and desirable if deemed to be in my (our) best interest.     Revised 8/2/2021                                                                          Page 1 of 2                     3. I acknowledge that health care personnel may be observing this procedure for the purpose of medical education or other specified purposes as may be necessary as requested and/or approved by my (our) physician. 4. I (we) consent to the disposal by the hospital Pathologist of the removed tissue, parts or organs in accordance with hospital policy. 5. I do ____ do not ____ consent to the use of a local infiltration pain blocking agent that will be used by my provider/surgical provider to help alleviate pain during my procedure. 6. I do ____ do not ____ consent to an emergent blood transfusion in the case of a life-threatening situation that requires blood components to be administered. This consent is valid for 24 hours from the beginning of the procedure. 7. This patient does ____ or does not ____ currently have a DNR status/order. If DNR order is in place, obtain Addendum to the Surgical Consent for ALL Patients with a DNR Order to address prisca-operative status for limited intervention or DNR suspension.      8. I have read and fully understand the above Consent for Operation/Procedure and that all blanks were completed before I signed the consent.   _____________________________       _____________________      ____/____am/pm  Signature of Patient or legal representative      Printed Name / Relationship            Date / Time   ____________________________       _____________________      ____/____am/pm  Witness to Signature                                    Printed Name                    Date / Time     If patient is unable to sign or is a minor, complete the following)  Patient is a minor, ____ years of age, or unable to sign because:   ______________________________________________________________________________________________    [facility] If a phone consent is obtained, consent will be documented by using two health care professionals, each affirming that the consenting party has no questions and gives consent for the procedure discussed with the physician/provider.   _____________________          ____________________       _____/_____am/pm   2nd witness to phone consent        Printed name           Date / Time    Informed Consent:  I have provided the explanation described above in section 1 to the patient and/or legal representative.  I have provided the patient and/or legal representative with an opportunity to ask any questions about the proposed operation/procedure.   ___________________________          ____________________         ____/____am/pm  Provider / Proceduralist                            Printed name            Date / Time  Revised 8/2/2021                                                                      Page 2 of 2

## 2021-10-22 NOTE — PROGRESS NOTES
PRE-OP INSTRUCTIONS FOR THE SURGICAL PATIENT YOU ARE UNABLE TO MAKE CONTACT FOR AN INTERVIEW:    All patients having surgery or anesthesia are required to be Covid tested OR to have been vaccinated at least 14 days prior to your procedure. It is very important to return our call to [phone] and notify the staff of your last vaccination date otherwise you will be required to complete Covid PCR test within the 5-6 days prior to surgery & quarantine. The results will need to be faxed to PreAdmission Testing at [phone]. 1. Follow all instructions provided to you from your surgeons office, including your ARRIVAL TIME. 2. Enter the MAIN entrance located on [address] and report to the desk. 3. Bring your insurance & photo ID with you. You may also be asked to pay a co-pay, as you may want to bring a check or credit card with you. 4. Leave all other valuables at home. 5. Arrange for someone to drive you home and be with you for the first 24 hours after discharge. 6. Bring your medication list with you day of surgery with doses and frequency listed (including over the counter medications)  7. You must contact your surgeon for Instructions regarding:              - ALL medication instructions, especially if taking blood thinners, aspirin, or diabetic medication.         -Bariatric patients call surgeon if on diabetic medications as some need to be stopped 1 week preop  - IF  there is a change in your physical condition such as a cold, fever, rash, cuts, sores or any other infection, especially near your surgical site. 8. A Pre-op History and Physical for surgery MUST be completed by your Physician or an Urgent Care within 30 days of your procedure date. Please bring a copy with you on the day of your procedure and along with any other testing performed. 9. DO NOT EAT ANYTHING eight hours prior to arrival for surgery.   May have up to 8 ounces of water 4 hours prior to arrival for surgery. NOTE: ALL Gastric, Bariatric and Bowel surgery patients MUST follow their surgeon's instructions. 10. No gum, candy, mints, or ice chips day of procedure. 11. Please refrain from drinking alcohol the day before or day of your procedure. 12. Please do not smoke the day of your procedure. 13. Dress in loose, comfortable clothing appropriate for redressing after your procedure. Do not wear jewelry (including body piercings), make-up, fingernail polish, lotion, powders or metal hairclips. 15. Contacts will need to be removed prior to surgery. You may want to bring your eye glasses to wear immediately before and after surgery. 14. Dentures will need to be removed before your procedure. 13. Bring cases for your glasses, contacts, dentures, or hearing aids to protect them while you are in surgery. 16. If you use a CPAP, please bring it with you on the day of your procedure. 17. Do not shave or wax for 72 hours prior to procedure near your operative site  18. FOR WOMAN OF CHILDBEARING AGE ONLY- please make sure we can collect a urine sample on arrival.     If you have further questions, you may contact your surgeon's office or us at [phone]     Left instructions on patient's voicemail.     Elle Carey RN.10/22/2021 .7:55 AM

## 2021-10-26 ENCOUNTER — ANESTHESIA EVENT (OUTPATIENT)
Dept: OPERATING ROOM | Age: 46
End: 2021-10-26
Payer: COMMERCIAL

## 2021-10-27 ENCOUNTER — HOSPITAL ENCOUNTER (OUTPATIENT)
Age: 46
Setting detail: OUTPATIENT SURGERY
Discharge: HOME | End: 2021-10-27
Attending: PODIATRIST | Admitting: PODIATRIST
Payer: COMMERCIAL

## 2021-10-27 ENCOUNTER — ANESTHESIA (OUTPATIENT)
Dept: OPERATING ROOM | Age: 46
End: 2021-10-27
Payer: COMMERCIAL

## 2021-10-27 ENCOUNTER — APPOINTMENT (OUTPATIENT)
Dept: GENERAL RADIOLOGY | Age: 46
End: 2021-10-27
Attending: PODIATRIST
Payer: COMMERCIAL

## 2021-10-27 VITALS
TEMPERATURE: 97.2 F | HEART RATE: 61 BPM | WEIGHT: 293 LBS | RESPIRATION RATE: 16 BRPM | BODY MASS INDEX: 41.02 KG/M2 | SYSTOLIC BLOOD PRESSURE: 147 MMHG | DIASTOLIC BLOOD PRESSURE: 78 MMHG | OXYGEN SATURATION: 95 % | HEIGHT: 71 IN

## 2021-10-27 VITALS — TEMPERATURE: 96.8 F | DIASTOLIC BLOOD PRESSURE: 84 MMHG | SYSTOLIC BLOOD PRESSURE: 151 MMHG | OXYGEN SATURATION: 96 %

## 2021-10-27 DIAGNOSIS — G89.18 POST-OP PAIN: Primary | ICD-10-CM

## 2021-10-27 LAB — PREGNANCY, URINE: NEGATIVE

## 2021-10-27 PROCEDURE — 7100000011 HC PHASE II RECOVERY - ADDTL 15 MIN: Performed by: PODIATRIST

## 2021-10-27 PROCEDURE — 2720000010 HC SURG SUPPLY STERILE: Performed by: PODIATRIST

## 2021-10-27 PROCEDURE — 97530 THERAPEUTIC ACTIVITIES: CPT

## 2021-10-27 PROCEDURE — 3700000001 HC ADD 15 MINUTES (ANESTHESIA): Performed by: PODIATRIST

## 2021-10-27 PROCEDURE — 73630 X-RAY EXAM OF FOOT: CPT

## 2021-10-27 PROCEDURE — 6360000002 HC RX W HCPCS: Performed by: ANESTHESIOLOGY

## 2021-10-27 PROCEDURE — 2709999900 HC NON-CHARGEABLE SUPPLY: Performed by: PODIATRIST

## 2021-10-27 PROCEDURE — 84703 CHORIONIC GONADOTROPIN ASSAY: CPT

## 2021-10-27 PROCEDURE — 2580000003 HC RX 258: Performed by: ANESTHESIOLOGY

## 2021-10-27 PROCEDURE — 2580000003 HC RX 258: Performed by: PODIATRIST

## 2021-10-27 PROCEDURE — 97161 PT EVAL LOW COMPLEX 20 MIN: CPT

## 2021-10-27 PROCEDURE — 6360000002 HC RX W HCPCS: Performed by: PODIATRIST

## 2021-10-27 PROCEDURE — 2500000003 HC RX 250 WO HCPCS: Performed by: NURSE ANESTHETIST, CERTIFIED REGISTERED

## 2021-10-27 PROCEDURE — 7100000010 HC PHASE II RECOVERY - FIRST 15 MIN: Performed by: PODIATRIST

## 2021-10-27 PROCEDURE — 3600000004 HC SURGERY LEVEL 4 BASE: Performed by: PODIATRIST

## 2021-10-27 PROCEDURE — 3700000000 HC ANESTHESIA ATTENDED CARE: Performed by: PODIATRIST

## 2021-10-27 PROCEDURE — 6360000002 HC RX W HCPCS: Performed by: NURSE ANESTHETIST, CERTIFIED REGISTERED

## 2021-10-27 PROCEDURE — 97116 GAIT TRAINING THERAPY: CPT

## 2021-10-27 PROCEDURE — 3600000014 HC SURGERY LEVEL 4 ADDTL 15MIN: Performed by: PODIATRIST

## 2021-10-27 PROCEDURE — 7100000001 HC PACU RECOVERY - ADDTL 15 MIN: Performed by: PODIATRIST

## 2021-10-27 PROCEDURE — 7100000000 HC PACU RECOVERY - FIRST 15 MIN: Performed by: PODIATRIST

## 2021-10-27 PROCEDURE — 6370000000 HC RX 637 (ALT 250 FOR IP): Performed by: ANESTHESIOLOGY

## 2021-10-27 PROCEDURE — C1713 ANCHOR/SCREW BN/BN,TIS/BN: HCPCS | Performed by: PODIATRIST

## 2021-10-27 PROCEDURE — 2500000003 HC RX 250 WO HCPCS: Performed by: PODIATRIST

## 2021-10-27 DEVICE — ANATOMIC BIPLANAR IMPLANTS
Type: IMPLANTABLE DEVICE | Site: FOOT | Status: FUNCTIONAL
Brand: LAPIPLASTY SYSTEM 2

## 2021-10-27 DEVICE — LOCKING SCREWS
Type: IMPLANTABLE DEVICE | Site: FOOT | Status: FUNCTIONAL
Brand: FASTPITCH 2.7MM HIGH PITCH LOCKING SCREW

## 2021-10-27 RX ORDER — MAGNESIUM HYDROXIDE 1200 MG/15ML
LIQUID ORAL CONTINUOUS PRN
Status: COMPLETED | OUTPATIENT
Start: 2021-10-27 | End: 2021-10-27

## 2021-10-27 RX ORDER — ASPIRIN 325 MG
325 TABLET, DELAYED RELEASE (ENTERIC COATED) ORAL DAILY
Qty: 30 TABLET | Refills: 1 | Status: SHIPPED | OUTPATIENT
Start: 2021-10-27 | End: 2021-12-26

## 2021-10-27 RX ORDER — FENTANYL CITRATE 50 UG/ML
50 INJECTION, SOLUTION INTRAMUSCULAR; INTRAVENOUS EVERY 5 MIN PRN
Status: DISCONTINUED | OUTPATIENT
Start: 2021-10-27 | End: 2021-10-27 | Stop reason: HOSPADM

## 2021-10-27 RX ORDER — FENTANYL CITRATE 50 UG/ML
25 INJECTION, SOLUTION INTRAMUSCULAR; INTRAVENOUS EVERY 5 MIN PRN
Status: DISCONTINUED | OUTPATIENT
Start: 2021-10-27 | End: 2021-10-27 | Stop reason: HOSPADM

## 2021-10-27 RX ORDER — HYDRALAZINE HYDROCHLORIDE 20 MG/ML
5 INJECTION INTRAMUSCULAR; INTRAVENOUS EVERY 10 MIN PRN
Status: DISCONTINUED | OUTPATIENT
Start: 2021-10-27 | End: 2021-10-27 | Stop reason: HOSPADM

## 2021-10-27 RX ORDER — SODIUM CHLORIDE 0.9 % (FLUSH) 0.9 %
5-40 SYRINGE (ML) INJECTION PRN
Status: DISCONTINUED | OUTPATIENT
Start: 2021-10-27 | End: 2021-10-27 | Stop reason: HOSPADM

## 2021-10-27 RX ORDER — OXYCODONE HYDROCHLORIDE AND ACETAMINOPHEN 5; 325 MG/1; MG/1
1 TABLET ORAL EVERY 6 HOURS PRN
Qty: 28 TABLET | Refills: 0 | Status: SHIPPED | OUTPATIENT
Start: 2021-10-27 | End: 2021-11-03

## 2021-10-27 RX ORDER — BUPIVACAINE HYDROCHLORIDE 5 MG/ML
INJECTION, SOLUTION EPIDURAL; INTRACAUDAL PRN
Status: DISCONTINUED | OUTPATIENT
Start: 2021-10-27 | End: 2021-10-27 | Stop reason: ALTCHOICE

## 2021-10-27 RX ORDER — GABAPENTIN 300 MG/1
300 CAPSULE ORAL NIGHTLY
Qty: 30 CAPSULE | Refills: 0 | Status: SHIPPED | OUTPATIENT
Start: 2021-10-27 | End: 2021-11-26

## 2021-10-27 RX ORDER — SODIUM CHLORIDE 0.9 % (FLUSH) 0.9 %
5-40 SYRINGE (ML) INJECTION EVERY 12 HOURS SCHEDULED
Status: DISCONTINUED | OUTPATIENT
Start: 2021-10-27 | End: 2021-10-27 | Stop reason: HOSPADM

## 2021-10-27 RX ORDER — APREPITANT 40 MG/1
40 CAPSULE ORAL ONCE
Status: COMPLETED | OUTPATIENT
Start: 2021-10-27 | End: 2021-10-27

## 2021-10-27 RX ORDER — SUCCINYLCHOLINE/SOD CL,ISO/PF 200MG/10ML
SYRINGE (ML) INTRAVENOUS PRN
Status: DISCONTINUED | OUTPATIENT
Start: 2021-10-27 | End: 2021-10-27 | Stop reason: SDUPTHER

## 2021-10-27 RX ORDER — SODIUM CHLORIDE, SODIUM LACTATE, POTASSIUM CHLORIDE, CALCIUM CHLORIDE 600; 310; 30; 20 MG/100ML; MG/100ML; MG/100ML; MG/100ML
INJECTION, SOLUTION INTRAVENOUS CONTINUOUS
Status: DISCONTINUED | OUTPATIENT
Start: 2021-10-27 | End: 2021-10-27 | Stop reason: HOSPADM

## 2021-10-27 RX ORDER — IBUPROFEN 800 MG/1
800 TABLET ORAL EVERY 8 HOURS PRN
Qty: 30 TABLET | Refills: 1 | Status: SHIPPED | OUTPATIENT
Start: 2021-10-27

## 2021-10-27 RX ORDER — LIDOCAINE HYDROCHLORIDE 20 MG/ML
INJECTION, SOLUTION INFILTRATION; PERINEURAL PRN
Status: DISCONTINUED | OUTPATIENT
Start: 2021-10-27 | End: 2021-10-27 | Stop reason: ALTCHOICE

## 2021-10-27 RX ORDER — ONDANSETRON 2 MG/ML
4 INJECTION INTRAMUSCULAR; INTRAVENOUS
Status: DISCONTINUED | OUTPATIENT
Start: 2021-10-27 | End: 2021-10-27 | Stop reason: HOSPADM

## 2021-10-27 RX ORDER — PROPOFOL 10 MG/ML
INJECTION, EMULSION INTRAVENOUS PRN
Status: DISCONTINUED | OUTPATIENT
Start: 2021-10-27 | End: 2021-10-27 | Stop reason: SDUPTHER

## 2021-10-27 RX ORDER — FENTANYL CITRATE 50 UG/ML
INJECTION, SOLUTION INTRAMUSCULAR; INTRAVENOUS PRN
Status: DISCONTINUED | OUTPATIENT
Start: 2021-10-27 | End: 2021-10-27 | Stop reason: SDUPTHER

## 2021-10-27 RX ORDER — PROCHLORPERAZINE EDISYLATE 5 MG/ML
5 INJECTION INTRAMUSCULAR; INTRAVENOUS
Status: DISCONTINUED | OUTPATIENT
Start: 2021-10-27 | End: 2021-10-27 | Stop reason: HOSPADM

## 2021-10-27 RX ORDER — LABETALOL HYDROCHLORIDE 5 MG/ML
5 INJECTION, SOLUTION INTRAVENOUS EVERY 10 MIN PRN
Status: DISCONTINUED | OUTPATIENT
Start: 2021-10-27 | End: 2021-10-27 | Stop reason: HOSPADM

## 2021-10-27 RX ORDER — DIPHENHYDRAMINE HYDROCHLORIDE 50 MG/ML
12.5 INJECTION INTRAMUSCULAR; INTRAVENOUS
Status: DISCONTINUED | OUTPATIENT
Start: 2021-10-27 | End: 2021-10-27 | Stop reason: HOSPADM

## 2021-10-27 RX ORDER — MEPERIDINE HYDROCHLORIDE 25 MG/ML
12.5 INJECTION INTRAMUSCULAR; INTRAVENOUS; SUBCUTANEOUS EVERY 5 MIN PRN
Status: DISCONTINUED | OUTPATIENT
Start: 2021-10-27 | End: 2021-10-27 | Stop reason: HOSPADM

## 2021-10-27 RX ORDER — HYDROMORPHONE HCL 110MG/55ML
PATIENT CONTROLLED ANALGESIA SYRINGE INTRAVENOUS PRN
Status: DISCONTINUED | OUTPATIENT
Start: 2021-10-27 | End: 2021-10-27 | Stop reason: SDUPTHER

## 2021-10-27 RX ORDER — ROCURONIUM BROMIDE 10 MG/ML
INJECTION, SOLUTION INTRAVENOUS PRN
Status: DISCONTINUED | OUTPATIENT
Start: 2021-10-27 | End: 2021-10-27 | Stop reason: SDUPTHER

## 2021-10-27 RX ORDER — ONDANSETRON 2 MG/ML
INJECTION INTRAMUSCULAR; INTRAVENOUS PRN
Status: DISCONTINUED | OUTPATIENT
Start: 2021-10-27 | End: 2021-10-27 | Stop reason: SDUPTHER

## 2021-10-27 RX ORDER — PROMETHAZINE HYDROCHLORIDE 25 MG/1
25 TABLET ORAL EVERY 6 HOURS PRN
Qty: 20 TABLET | Refills: 0 | Status: SHIPPED | OUTPATIENT
Start: 2021-10-27

## 2021-10-27 RX ORDER — LIDOCAINE HYDROCHLORIDE 10 MG/ML
1 INJECTION, SOLUTION EPIDURAL; INFILTRATION; INTRACAUDAL; PERINEURAL
Status: DISCONTINUED | OUTPATIENT
Start: 2021-10-27 | End: 2021-10-27 | Stop reason: HOSPADM

## 2021-10-27 RX ORDER — SODIUM CHLORIDE 9 MG/ML
25 INJECTION, SOLUTION INTRAVENOUS PRN
Status: DISCONTINUED | OUTPATIENT
Start: 2021-10-27 | End: 2021-10-27 | Stop reason: HOSPADM

## 2021-10-27 RX ORDER — SCOLOPAMINE TRANSDERMAL SYSTEM 1 MG/1
1 PATCH, EXTENDED RELEASE TRANSDERMAL
Status: DISCONTINUED | OUTPATIENT
Start: 2021-10-27 | End: 2021-10-27 | Stop reason: HOSPADM

## 2021-10-27 RX ADMIN — ROCURONIUM BROMIDE 25 MG: 10 INJECTION INTRAVENOUS at 10:18

## 2021-10-27 RX ADMIN — SUGAMMADEX 200 MG: 100 INJECTION, SOLUTION INTRAVENOUS at 11:04

## 2021-10-27 RX ADMIN — Medication 100 MG: at 08:37

## 2021-10-27 RX ADMIN — PROPOFOL 50 MG: 10 INJECTION, EMULSION INTRAVENOUS at 08:38

## 2021-10-27 RX ADMIN — PROPOFOL 150 MG: 10 INJECTION, EMULSION INTRAVENOUS at 08:36

## 2021-10-27 RX ADMIN — SODIUM CHLORIDE, SODIUM LACTATE, POTASSIUM CHLORIDE, AND CALCIUM CHLORIDE: .6; .31; .03; .02 INJECTION, SOLUTION INTRAVENOUS at 08:10

## 2021-10-27 RX ADMIN — HYDROMORPHONE HYDROCHLORIDE 1 MG: 2 INJECTION, SOLUTION INTRAMUSCULAR; INTRAVENOUS; SUBCUTANEOUS at 10:45

## 2021-10-27 RX ADMIN — ONDANSETRON 4 MG: 2 INJECTION INTRAMUSCULAR; INTRAVENOUS at 10:45

## 2021-10-27 RX ADMIN — HYDROMORPHONE HYDROCHLORIDE 1 MG: 2 INJECTION, SOLUTION INTRAMUSCULAR; INTRAVENOUS; SUBCUTANEOUS at 10:18

## 2021-10-27 RX ADMIN — APREPITANT 40 MG: 40 CAPSULE ORAL at 08:09

## 2021-10-27 RX ADMIN — FENTANYL CITRATE 50 MCG: 50 INJECTION, SOLUTION INTRAMUSCULAR; INTRAVENOUS at 08:38

## 2021-10-27 RX ADMIN — ROCURONIUM BROMIDE 25 MG: 10 INJECTION INTRAVENOUS at 09:55

## 2021-10-27 RX ADMIN — FENTANYL CITRATE 50 MCG: 50 INJECTION, SOLUTION INTRAMUSCULAR; INTRAVENOUS at 08:36

## 2021-10-27 RX ADMIN — PROPOFOL 50 MG: 10 INJECTION, EMULSION INTRAVENOUS at 08:40

## 2021-10-27 RX ADMIN — CEFAZOLIN SODIUM 3000 MG: 1 INJECTION, POWDER, FOR SOLUTION INTRAMUSCULAR; INTRAVENOUS at 08:28

## 2021-10-27 RX ADMIN — ROCURONIUM BROMIDE 45 MG: 10 INJECTION INTRAVENOUS at 08:37

## 2021-10-27 RX ADMIN — ROCURONIUM BROMIDE 5 MG: 10 INJECTION INTRAVENOUS at 08:36

## 2021-10-27 ASSESSMENT — PULMONARY FUNCTION TESTS
PIF_VALUE: 22
PIF_VALUE: 23
PIF_VALUE: 22
PIF_VALUE: 0
PIF_VALUE: 1
PIF_VALUE: 23
PIF_VALUE: 0
PIF_VALUE: 0
PIF_VALUE: 22
PIF_VALUE: 23
PIF_VALUE: 22
PIF_VALUE: 23
PIF_VALUE: 0
PIF_VALUE: 22
PIF_VALUE: 24
PIF_VALUE: 22
PIF_VALUE: 23
PIF_VALUE: 0
PIF_VALUE: 22
PIF_VALUE: 22
PIF_VALUE: 23
PIF_VALUE: 21
PIF_VALUE: 22
PIF_VALUE: 0
PIF_VALUE: 22
PIF_VALUE: 23
PIF_VALUE: 22
PIF_VALUE: 0
PIF_VALUE: 22
PIF_VALUE: 0
PIF_VALUE: 22
PIF_VALUE: 0
PIF_VALUE: 23
PIF_VALUE: 22
PIF_VALUE: 0
PIF_VALUE: 22
PIF_VALUE: 22
PIF_VALUE: 0
PIF_VALUE: 0
PIF_VALUE: 22
PIF_VALUE: 23
PIF_VALUE: 23
PIF_VALUE: 22
PIF_VALUE: 0
PIF_VALUE: 23
PIF_VALUE: 23
PIF_VALUE: 0
PIF_VALUE: 2
PIF_VALUE: 30
PIF_VALUE: 24
PIF_VALUE: 22
PIF_VALUE: 0
PIF_VALUE: 22
PIF_VALUE: 24
PIF_VALUE: 0
PIF_VALUE: 22
PIF_VALUE: 22
PIF_VALUE: 1
PIF_VALUE: 22
PIF_VALUE: 0
PIF_VALUE: 22
PIF_VALUE: 0
PIF_VALUE: 22
PIF_VALUE: 0
PIF_VALUE: 22
PIF_VALUE: 0
PIF_VALUE: 23
PIF_VALUE: 0
PIF_VALUE: 1
PIF_VALUE: 23
PIF_VALUE: 22
PIF_VALUE: 0
PIF_VALUE: 0
PIF_VALUE: 22
PIF_VALUE: 21
PIF_VALUE: 22
PIF_VALUE: 22
PIF_VALUE: 23
PIF_VALUE: 0
PIF_VALUE: 0
PIF_VALUE: 22
PIF_VALUE: 0
PIF_VALUE: 22
PIF_VALUE: 24
PIF_VALUE: 22
PIF_VALUE: 0
PIF_VALUE: 22
PIF_VALUE: 2
PIF_VALUE: 24
PIF_VALUE: 0
PIF_VALUE: 22
PIF_VALUE: 0
PIF_VALUE: 22
PIF_VALUE: 22
PIF_VALUE: 1
PIF_VALUE: 0
PIF_VALUE: 22
PIF_VALUE: 24
PIF_VALUE: 22
PIF_VALUE: 22
PIF_VALUE: 0
PIF_VALUE: 0
PIF_VALUE: 24
PIF_VALUE: 22
PIF_VALUE: 0
PIF_VALUE: 22
PIF_VALUE: 21
PIF_VALUE: 23
PIF_VALUE: 23
PIF_VALUE: 22
PIF_VALUE: 22
PIF_VALUE: 0
PIF_VALUE: 21
PIF_VALUE: 22
PIF_VALUE: 0
PIF_VALUE: 22
PIF_VALUE: 0
PIF_VALUE: 22
PIF_VALUE: 0
PIF_VALUE: 0
PIF_VALUE: 23
PIF_VALUE: 23
PIF_VALUE: 22
PIF_VALUE: 0
PIF_VALUE: 22
PIF_VALUE: 26
PIF_VALUE: 22
PIF_VALUE: 0
PIF_VALUE: 22
PIF_VALUE: 22
PIF_VALUE: 1
PIF_VALUE: 22
PIF_VALUE: 0
PIF_VALUE: 23
PIF_VALUE: 23
PIF_VALUE: 24
PIF_VALUE: 22
PIF_VALUE: 22
PIF_VALUE: 23
PIF_VALUE: 0
PIF_VALUE: 2
PIF_VALUE: 22
PIF_VALUE: 22
PIF_VALUE: 0
PIF_VALUE: 22
PIF_VALUE: 23
PIF_VALUE: 22
PIF_VALUE: 0
PIF_VALUE: 22
PIF_VALUE: 21
PIF_VALUE: 22
PIF_VALUE: 25
PIF_VALUE: 22
PIF_VALUE: 0
PIF_VALUE: 21
PIF_VALUE: 22
PIF_VALUE: 23
PIF_VALUE: 3
PIF_VALUE: 2
PIF_VALUE: 23
PIF_VALUE: 22
PIF_VALUE: 22
PIF_VALUE: 23
PIF_VALUE: 0
PIF_VALUE: 22

## 2021-10-27 ASSESSMENT — PAIN DESCRIPTION - DESCRIPTORS: DESCRIPTORS: PRESSURE

## 2021-10-27 ASSESSMENT — PAIN - FUNCTIONAL ASSESSMENT
PAIN_FUNCTIONAL_ASSESSMENT: 0-10
PAIN_FUNCTIONAL_ASSESSMENT: 0-10

## 2021-10-27 ASSESSMENT — PAIN DESCRIPTION - LOCATION: LOCATION: FOOT

## 2021-10-27 ASSESSMENT — PAIN SCALES - GENERAL
PAINLEVEL_OUTOF10: 2
PAINLEVEL_OUTOF10: 0

## 2021-10-27 ASSESSMENT — PAIN DESCRIPTION - PAIN TYPE: TYPE: SURGICAL PAIN

## 2021-10-27 ASSESSMENT — PAIN DESCRIPTION - ORIENTATION: ORIENTATION: RIGHT

## 2021-10-27 NOTE — BRIEF OP NOTE
Brief Postoperative Note      Patient: Morena Elaine  YOB: 1975  MRN: [id number]    Date of Procedure: 10/27/2021    Pre-Op Diagnosis: Hallux abducto valgus, right [M20.11]    Post-Op Diagnosis: Same       Procedure(s):  LAPIDUS ARTHRODESIS RIGHT FOOT    Surgeon(s):  Marcheta Favre, DPM    Assistant:  Resident: Frazier Pleas, DPM Claudean Peed, MS-4    Anesthesia: General    Hemostasis: Pneumatic calf tourniquet at 250mmHg for 124 minutes    Injectables: 20cc of 2% lidocaine plain preoperatively  30cc of 0.5% marcaine plain postoperatively    Materials: 3-0 vicryl, 4-0 vicryl, 4-0 monocryl, 4-0 nylon    Implants: [address] [place] [address]    Estimated Blood Loss (mL): Minimal    Complications: None    Specimens:   * No specimens in log *    Implants:  * No implants in log *      Drains: * No LDAs found *    Findings: Good correction of hallux abducto valgus deformity noted    Electronically signed by Chance Sigala DPM on 10/27/2021 at 11:14 AM

## 2021-10-27 NOTE — PROGRESS NOTES
Physical Therapy    Facility/Department: [address] [place] [address] GENERAL SURGERY  Initial Assessment/Treatment/Discharge    NAME: Devaughn Ware  : 1975  MRN: [id number]    Date of Service: 10/27/2021    Discharge Recommendations:    Devaughn Ware scored a 19/24 on the AM-PAC short mobility form. Current research shows that an AM-PAC score of 18 or greater is typically associated with a discharge to the patient's home setting. Please see assessment section for further patient specific details. If patient discharges prior to next session this note will serve as a discharge summary. Please see below for the latest assessment towards goals. PT Equipment Recommendations  Equipment Needed: Yes  Mobility Devices: Malachi [place]: Rolling  Other: Heavy duty    Assessment   Body structures, Functions, Activity limitations: Decreased functional mobility   Assessment: Pt safe to go home with 24-hr assist of wife. Recommend heavy duty RW for home. Recommend use of gait belt for ambulation, in/out of car, on stairs. Pt & wife agreeable. Treatment Diagnosis: decreased funcitonal mobility  Decision Making: Low Complexity  PT Education: PT Role;Transfer Training;General Safety; Family Education;Equipment;Weight-bearing Education;Gait Training (stairs)  REQUIRES PT FOLLOW UP: No (pt being D/C'd home today)  Activity Tolerance  Activity Tolerance: Patient Tolerated treatment well (limited by nausea)       Patient Diagnosis(es): The encounter diagnosis was Post-op pain. has a past medical history of Arthritis, Asthma due to environmental allergies, Bipolar 2 disorder ([facility] [address] Depression, Environmental allergies, Hypertension, PONV (postoperative nausea and vomiting), Primary osteoarthritis of right knee, PTSD (post-traumatic stress disorder), and Shoulder instability. has a past surgical history that includes knee surgery; Wrist surgery; cyst removal; knee surgery;  Tonsillectomy; Wrist fracture surgery; Dilation and curettage of uterus; other surgical history (01/19/2017); and pr knee scope,med or lat menis repair (Right, 11/01/2018). Restrictions  Position Activity Restriction  Other position/activity restrictions: NWB right LE  Vision/Hearing  Vision: Within Functional Limits  Hearing: Within functional limits     Subjective  General  Chart Reviewed: Yes  Patient assessed for rehabilitation services?: Yes  Additional Pertinent Hx: s/p LAPIDUS ARTHRODESIS RIGHT FOOT 10/27. Family / Caregiver Present: Yes (wife)  Referring Practitioner: Chad  Referral Date : 10/27/21  Diagnosis: Hallux abducto valgus, right  Follows Commands: Within Functional Limits  Subjective  Subjective: Pt supine in bed & agreeable to PT. Pain Screening  Patient Currently in Pain: Denies  Vital Signs  Patient Currently in Pain: Denies       Orientation  Orientation  Overall Orientation Status: Within Normal Limits  Social/Functional History  Social/Functional History  Lives With: Spouse  Type of Home: House  Home Layout: Two level, Bed/Bath upstairs, Able to Live on Main level with bedroom/bathroom  Home Access: Stairs to enter without rails  Entrance Stairs - Number of Steps: 2  Bathroom Shower/Tub: Tub/Shower unit  Bathroom Toilet: Standard  ADL Assistance: Independent  Ambulation Assistance: Independent  Transfer Assistance: Independent  Active : Yes  Occupation: Full time employment  Additional Comments: no falls.  spouse can provide 24-hr       Objective          AROM RLE (degrees)  RLE AROM: WNL  RLE General AROM: ankle NT d/t sx  AROM LLE (degrees)  LLE AROM : WNL  Strength RLE  Comment: NT d/t sx, NWB  Strength LLE  Strength LLE: WFL        Bed mobility  Supine to Sit: Stand by assistance  Sit to Supine: Stand by assistance  Transfers  Sit to Stand: Contact guard assistance (from bed to RW)  Stand to sit: Contact guard assistance  Ambulation  Ambulation?: Yes  Ambulation 1  Surface: level tile  Device: Rolling Walker  Assistance: Contact guard assistance  Distance: 20 ft  Comments: hopping on left LE, pt maintaining NWB right LE. pt became nauseated & diaphoretic, RN informed. wife educated on use of gait belt & verbalized understanding. Stairs/Curb  Stairs? :  (unable to attempt d/t pt nauseated. demonstrated technique for 2 stairs into garage to pt & wife. recommend pt ascend flight to 2nd floor seated vs stay on 1st floor.  discussed seated technique for up/down flight of stairs.)     Balance  Sitting - Static: Good  Sitting - Dynamic: Good  Standing - Static: Good;- (CGA with RW)  Standing - Dynamic: Fair;+ (CGA with RW)        Plan   Safety Devices  Type of devices: Call light within reach, Left in bed, Nurse notified (pt in SDS)                                                       AM-PAC Score  AM-PAC Inpatient Mobility Raw Score : 19 (10/27/21 1528)  AM-PAC Inpatient T-Scale Score : 45.44 (10/27/21 1528)  Mobility Inpatient CMS 0-100% Score: 41.77 (10/27/21 1528)  Mobility Inpatient CMS G-Code Modifier : CK (10/27/21 1528)              Therapy Time   Individual Concurrent Group Co-treatment   Time In 1400         Time Out 1500         Minutes 60                 Lani Whitten, PT

## 2021-10-27 NOTE — PROGRESS NOTES
Patient admitted to PACU # 18 from OR at [address] - Right   per Dr. Tony Finney. Attached to PACU monitoring system and report received from anesthesia provider. Patient was reported to be hemodynamically stable during procedure. Patient drowsy on admission and denied pain. Pt NSR on monitor. R surgical drsg with ace bandage and splint. RLE elevated on pillow and ice pack applied. Will continue to monitor. Patient has no objection to blood transfusions.

## 2021-10-27 NOTE — OP NOTE
Operative Note      Patient: Wilmer Braden  YOB: 1975  MRN: [id number]    Date of Procedure: 10/27/2021    Pre-Op Diagnosis: Hallux abducto valgus, right [M20.11]    Post-Op Diagnosis: Same       Procedure(s):  LAPIDUS ARTHRODESIS RIGHT FOOT    Surgeon(s):  Marcelo Justice DPM    Assistant:   Resident: Elaine Lozano DPM    Op Report Dictation #: [id number]    Electronically signed by Diane Moon DPM on 10/27/2021 at 11:42 AM

## 2021-10-27 NOTE — ANESTHESIA PRE PROCEDURE
Department of Anesthesiology  Preprocedure Note       Name:  Harriet Arteaga   Age:  48 y.o.  :  1975                                          MRN:  [id number]         Date:  10/27/2021      Surgeon: Trena Pollock):  Irma Johnson DPM    Procedure: Procedure(s):  LAPIDUS ARTHRODESIS RIGHT FOOT    Medications prior to admission:   Prior to Admission medications    Medication Sig Start Date End Date Taking? Authorizing Provider   cyclobenzaprine (FLEXERIL) 10 MG tablet Take 1 tablet by mouth nightly as needed for Muscle spasms 19   Rupesh Abrams MD   gabapentin (NEURONTIN) 300 MG capsule Take 1 capsule by mouth 2 times daily for 60 days. Intended supply: 90 days 19  Rupesh Abrams MD   meloxicam SALVADOR PASCUAL [facility]) 15 MG tablet Take 1 tablet by mouth daily 19   Rupesh Abrams MD   ibuprofen (IBU) 800 MG tablet 1 po q up to 3 times daily 18   Kesha Arenas MD   QUEtiapine (SEROQUEL) 25 MG tablet Take 25 mg by mouth 2 times daily    Historical Provider, MD   PARoxetine (PAXIL) 10 MG tablet  17   Historical Provider, MD   loratadine (CLARITIN) 10 MG capsule Take 10 mg by mouth daily    Historical Provider, MD   EPINEPHrine HCl, Anaphylaxis, (EPIPEN 2-DO IM) Inject into the muscle    Historical Provider, MD   Multiple Vitamins-Minerals (THERAPEUTIC MULTIVITAMIN-MINERALS) tablet Take 1 tablet by mouth daily    Historical Provider, MD   buPROPion (WELLBUTRIN XL) 150 MG extended release tablet Take 150 mg by mouth every morning    Historical Provider, MD   zolpidem (AMBIEN) 10 MG tablet  16   Historical Provider, MD   ziprasidone (GEODON) 40 MG capsule Take 40 mg by mouth 2 times daily (with meals). Historical Provider, MD   zolpidem (AMBIEN) 10 MG tablet Take 10 mg by mouth nightly as needed.       Historical Provider, MD       Current medications:    Current Facility-Administered Medications   Medication Dose Route Frequency Provider Last Rate Last Admin    ceFAZolin (ANCEF) 2000 mg in dextrose 5 % 50 mL IVPB  2,000 mg IntraVENous Once Kati Dey DPM        lactated ringers infusion   IntraVENous Continuous General Etienne MD        sodium chloride flush 0.9 % injection 5-40 mL  5-40 mL IntraVENous 2 times per day General Etienne MD        sodium chloride flush 0.9 % injection 5-40 mL  5-40 mL IntraVENous PRN General Etienne MD        0.9 % sodium chloride infusion  25 mL IntraVENous PRN General Etienne MD        lidocaine PF 1 % injection 1 mL  1 mL IntraDERmal Once PRN General Etienne MD           Allergies: Allergies   Allergen Reactions    Morphine Itching    Mucomyst [Acetylcysteine] Itching    Morphine Itching    Mucomyst [Acetylcysteine] Itching       Problem List:    Patient Active Problem List   Diagnosis Code    Acetaminophen overdose T39.1X1A    Suicide attempt ([facility] [address] T14.91XA    Depression F32. A    Acromioclavicular joint arthritis M19.019    Tear of medial meniscus of right knee, current S83.241A    Primary osteoarthritis of right knee M17.11    S/P medial meniscectomy of right knee Z98.890    Bilateral leg numbness R20.0       Past Medical History:        Diagnosis Date    Arthritis     Asthma due to environmental allergies     Bipolar 2 disorder ([facility] [address]     Depression     Environmental allergies     Hypertension     unspecified    Primary osteoarthritis of right knee 5/31/2018    PTSD (post-traumatic stress disorder)     Shoulder instability     left       Past Surgical History:        Procedure Laterality Date    CYST REMOVAL      DILATION AND CURETTAGE OF UTERUS      KNEE SURGERY      LEFT KNEE    KNEE SURGERY      OTHER SURGICAL HISTORY  01/19/2017    LEFT SHOULDER ARTHROSCOPY LABRAL DEBRIDEMENT SUBACROMIAL    [place] KNEE SCOPE,MED OR LAT MENIS REPAIR Right 11/1/2018    RIGHT KNEE ARTHROSCOPY, MEDIAL MENISCECTOMY, SYNOVECTOMY, CHONDROPLASTY performed by Slim Lopez MD at [address] [place] [address]  WRIST FRACTURE SURGERY      WRIST SURGERY      RIGHT GANGLION       Social History:    Social History     Tobacco Use    Smoking status: Never Smoker    Smokeless tobacco: Never Used   Substance Use Topics    Alcohol use: No     Alcohol/week: 0.0 standard drinks     Comment: VERY RARE                                Counseling given: Not Answered      Vital Signs (Current):   Vitals:    10/27/21 0744   BP: 122/75   Pulse: 77   Resp: 16   Temp: 97.2 °F (36.2 °C)   TempSrc: Oral   SpO2: 98%   Weight: (!) 302 lb (137 kg)   Height: 5' 11\" (1.803 m)                                              BP Readings from Last 3 Encounters:   10/27/21 122/75   12/12/19 111/69   11/20/18 134/83       NPO Status:                                                                                 BMI:   Wt Readings from Last 3 Encounters:   10/27/21 (!) 302 lb (137 kg)   12/30/19 279 lb 1.6 oz (126.6 kg)   12/12/19 279 lb (126.6 kg)     Body mass index is 42.12 kg/m². CBC:   Lab Results   Component Value Date    WBC 9.6 06/04/2019    RBC 4.75 06/04/2019    HGB 13.1 06/04/2019    HCT 40.0 06/04/2019    MCV 84.1 06/04/2019    RDW 14.1 06/04/2019     06/04/2019       CMP:   Lab Results   Component Value Date     06/04/2019    K 4.3 06/04/2019     06/04/2019    CO2 23 06/04/2019    BUN 17 06/04/2019    CREATININE 0.7 06/04/2019    GFRAA >60 06/04/2019    GFRAA >60 08/02/2011    LABGLOM >60 06/04/2019    GLUCOSE 108 06/04/2019    PROT 7.9 08/02/2011    CALCIUM 9.6 06/04/2019    BILITOT 0.30 08/02/2011    ALKPHOS 82 08/02/2011    AST 16 08/02/2011    ALT 16 08/02/2011       POC Tests: No results for input(s): POCGLU, POCNA, POCK, POCCL, POCBUN, POCHEMO, POCHCT in the last 72 hours.     Coags:   Lab Results   Component Value Date    PROTIME 15.1 08/02/2011    INR 1.37 08/02/2011    APTT 31.7 08/01/2011       HCG (If Applicable):   Lab Results   Component Value Date    PREGTESTUR Negative 10/27/2021        ABGs: No results found for: PHART, PO2ART, AMG4SAZ, OFK8ERO, BEART, W5KKQGEQ     Type & Screen (If Applicable):  No results found for: LABABO, LABRH    Drug/Infectious Status (If Applicable):  No results found for: HIV, HEPCAB    COVID-19 Screening (If Applicable): No results found for: COVID19        Anesthesia Evaluation    Airway: Mallampati: II  TM distance: >3 FB   Neck ROM: full  Mouth opening: > = 3 FB Dental:          Pulmonary:       (-) asthma and sleep apnea                           Cardiovascular:  Exercise tolerance: good (>4 METS),   (+) hypertension:,     (-)  angina and  HENDRIX                Neuro/Psych:      (-) seizures           GI/Hepatic/Renal:   (+) morbid obesity     (-) GERD       Endo/Other:        (-) diabetes mellitus               Abdominal:             Vascular: Other Findings:             Anesthesia Plan      general     ASA 3       Induction: intravenous. MIPS: Postoperative opioids intended. Anesthetic plan and risks discussed with patient. Plan discussed with CRNA.                   Amy Canales MD   10/27/2021

## 2021-10-27 NOTE — PROGRESS NOTES
PACU Transfer to [facility]    Vitals:    10/27/21 1257   BP: 129/77   Pulse: 69   Resp: 10   Temp: 96.3 °F (35.7 °C)   SpO2: 94%         Intake/Output Summary (Last 24 hours) at 10/27/2021 1302  Last data filed at 10/27/2021 1259  Gross per 24 hour   Intake 162 ml   Output 0 ml   Net 162 ml       Pain assessment:  none  Pain Level: 0    Patient transferred to care of [facility] RN. Report called to Neto Lamas RN.     10/27/2021 1:02 PM

## 2021-10-27 NOTE — PROGRESS NOTES
Pre-Operative Biomechanical Exam  Amanda Padilla      Biomechanical:  Biomechanics:   Gait Analysis:  Angle of Gait    R: 10  L:  10     Base of Gait    4cm      Stance Phase: Increased IM 1-2 with valgus rotation of right hallux     Swing Phase: No abnormal findings     Postural Considerations: Symmetric     Ankle Joint: Dorsiflexion (KE):  R: 8   L:  7     Dorsiflexion (KF):  R: >10 L:  >10     Subtalar: Inversion:   R:  20  L:  20     Eversion:   R:  9  L:  9     Neutral Position:  R:  10 L:  10      Midtarsal: 1-5 Position:   R: 0  L: 0      First Ray: Dorsiflexion:   R:7mm L: 5mm     Plantarflexion:   R:6mm L: 5mm     First MPJ:  Dorsiflexion (unloaded): R: 65 L: 65     Dorsiflexion (loaded):  R: 65 L: 65     Static Stance: RCSP:    R: 0  L:  0     NCSP:    R: 0 L:  0     Tibial Influence:  R: 0 L:  0       Assessment:   Hallux abducto valgus; right foot    Plan:  Surgical Intervention consisting of Lapidus arthrodesis of right foot    Elsie Del Castillo DPM  Podiatric Resident, PGY-2

## 2021-10-27 NOTE — ANESTHESIA POSTPROCEDURE EVALUATION
Department of Anesthesiology  Postprocedure Note    Patient: Raymond Barnard  MRN: [id number]  YOB: 1975  Date of evaluation: 10/27/2021  Time:  1:03 PM     Procedure Summary     Date: 10/27/21 Room / Location: [facility] [address] [address] [facility],[address]    Anesthesia Start: 5971 Anesthesia Stop: 1120    Procedure: LAPIDUS ARTHRODESIS RIGHT FOOT (Right Foot) Diagnosis:       Hallux abducto valgus, right      (Hallux abducto valgus, right [M20.11])    Surgeons: Chandra Torres DPM Responsible Provider: Amy Canales MD    Anesthesia Type: general ASA Status: 3          Anesthesia Type: general    Forest Phase I: Forest Score: 10    Forest Phase II:      Last vitals: Reviewed and per EMR flowsheets.        Anesthesia Post Evaluation    Patient location during evaluation: PACU  Patient participation: complete - patient participated  Level of consciousness: awake  Pain score: 2  Airway patency: patent  Nausea & Vomiting: no nausea and no vomiting  Complications: no  Cardiovascular status: hemodynamically stable  Respiratory status: acceptable  Hydration status: euvolemic

## 2021-10-27 NOTE — OP NOTE
[address]                                OPERATIVE REPORT    PATIENT NAME: Darnell Canales                    :        1975  MED REC NO:   [id number]                          ROOM:  ACCOUNT NO:   [de-identified]                           ADMIT DATE: 10/27/2021  PROVIDER:     Danyel Del Toro DPM    DATE OF PROCEDURE:  10/27/2021    SURGEON:  Danyel Del Toro DPM    ASSISTANT:  Taylor Welsh DPM, PGY-2    PREOPERATIVE DIAGNOSIS:  Hallux abductovalgus deformity, right foot. POSTOPERATIVE DIAGNOSIS:  Hallux abductovalgus deformity, right foot. PROCEDURE:  Lapidus arthrodesis, right foot. PATHOLOGY:  No specimen. ANESTHESIA:  General anesthetic with a local block consisting a total of  20 mL of 2% lidocaine plain. Postoperative injection includes 30 mL of  0.5% Marcaine plain. ESTIMATED BLOOD LOSS:  None. HEMOSTASIS:  Pneumatic calf tourniquet at 250 mmHg left up for 124  minutes. IMPLANTS:  Include a trace medical Lapiplasty system including two  locking plates with a total of eight locking screws. INDICATIONS FOR SURGERY:  The patient presented with a painful bunion  deformity after exhausting conservative care. The patient wished to  proceed with surgical intervention. All risks versus benefits of the  planned procedure were discussed with the patient in detail. All  questions were answered, no guarantees were given. DESCRIPTION OF PROCEDURE:  The patient was brought from the preoperative  area and placed on the operating room table in the supine position. Following induction of general anesthesia, a well-padded pneumatic calf  tourniquet was placed on the patient's well-padded right calf. A  timeout was performed. The patient, procedure, and operative site were  confirmed. The foot was then scrubbed, prepped, and draped in the usual  sterile manner.   The limb was exsanguinated and tourniquet was inflated  and the following procedure was then performed. LAPIDUS ARTHRODESIS, RIGHT FOOT:  At this time, an incision was made  just medial to the long extensor tendon beginning distal to the  metatarsophalangeal joint and extending to the proximal to the  met-cuneiform joint. This incision was created with a #15 blade. Dissection was carried down to the level of subcutaneous tissue with  care being taken to identify and retract all vital neural and vascular  structures during this dissection. All venous tributaries were  isolated, clamped, cut, and electrocauterized as encountered. Dissection was carried down to the deep fascia. The deep fascia was  incised. At this time, blunt dissection was carried down to the first  intermetatarsal space in order to perform a soft tissue release. The  deep transverse intermetatarsal ligament was resected and the lateral  collateral and tibial sesamoidal ligaments were released. Upon  completion of this, the lateral contracture noted to be present in the  great toe was markedly reduced. Attention directed back towards the  first met-cuneiform joint. The joint was isolated under C-arm  fluoroscopy and a capsular and periosteal incision were made with  adequate soft tissue exposure had been achieved. The joint was planed  with a Lapiplasty blade to allow free rotation for triplane correction. Upon completion of this, the joystick pin was inserted on the medial  aspect of the joint and it was freely mobile under C-arm fluoroscopy. At this time, a stab incision was made over the second metatarsal in  order to allow the joint reduction clamp to be placed. The fulcrum and  joint seeker were placed and then the joint reduction clamp was placed  over the lateral aspect of the second metatarsal and over the medial  aspect of the first.  The correction was then dialed in with the  triplane correction rotation.   Being happy with this correction, under  C-arm fluoroscopy, the pin was placed. Attention now directed towards  creation of the osteotomy. The cut guide was placed over the joint  seeker and secured with a total of three k-wires. The cut guide was  then utilized to create the arthrodesis side cuts. The locking guide  wire was then removed and the cut guide was removed along with the joint  seeker. The wedges of bone were removed. The joint was drilled and  fish-scaled. Being happy with this correction, the wound was then  irrigated with copious amounts of normal sterile saline; actually this  was done prior to fish scaling and drilling to allow the bone to act as  _____ graft. The smaller end of the fulcrum was then placed and the  correction was dialed in with the reduction clamp. Being happy with  this correction, under C-arm fluoroscopy, an [place] wire was placed at  the second point of fixation and the medial plate was secured utilizing  locking and non-locking screws. The dorsal reduction clamp was then  removed and a dorsal plate was placed utilizing both locking and  non-locking screws. Being happy with this correction, the Olive wire  was removed. The intermetatarsal ligament was stressed and there was no  significant deviation, so no further fixation was necessary. At this  time, the forefoot was loaded the digits, then sat in a rectus position,  so we did not need to open up the first MPJ to take off any redundant  capsule or dorsal medial eminence as the reduction was excellent. Being  happy with this correction, the wound was then irrigated and closed in  layers using 3-0 Vicryl and 4-0 Vicryl suture. The skin was closed  using 4-0 Monocryl suture in a running subcuticular stitch. Upon  completion of wound closure, postoperative injection consisting a total  of 30 mL of 0.5% Marcaine plain was instilled at the surgical incision  sites.   The wound was then dressed with Mastisol, Steri-Strips,  Betadine-soaked Adaptic, sterile 4 x 4s, 4 x 8s, sterile Kerlix, and  Chastity. The pneumatic calf tourniquet was rapidly deflated and prompt  instantaneous hyperemic response was noted in all digits of the right  foot. A Ramos compressive dry dressing with a posterior splint was then  applied. The patient tolerated the procedure and anesthesia well. The patient  left the OR with vital signs stable and vascular status intact to all  digits of the right foot. Following a period of postoperative  monitoring, the patient will be discharged to home with oral and written  instructions per myself. She will follow up in my office in three to  five days for her first postoperative visit.         Pina Gibson DPM    D: 10/27/2021 11:43:00       T: 10/27/2021 14:52:27     [email or web address]  Job#: [id number]     Doc#: [id number]    CC:  Светлана Johnson DPM

## 2021-10-27 NOTE — PROGRESS NOTES
Ambulatory Surgery/Procedure Discharge Note    Vitals:    10/27/21 1307   BP: (!) 147/78   Pulse: 61   Resp: 16   Temp: 97.2 °F (36.2 °C)   SpO2: 95%     Elevated BP meets Forest Score Criteria. No intake/output data recorded. Restroom use offered before discharge. Yes    Pain assessment:  level of pain (1-10, 10 severe),   Pain Level: 2    Drsg to RLE remains D&I with ice pack and elevated. Patient discharged to home/self care. Patient discharged via wheel chair by transporter to waiting family/S.O.       10/27/2021 3:40 PM    [address] from PT here to adjust walker and states 'pt safe to ambulate and discharge home with heavy duty walker' and both pt and S.O. states 'ready to go home'    1505 Spoke to Zabrina Recinos from PT and states 'will come back to adjust walker when in comes from [place]'. 1500 Tolerating po well. DC instructions given to pt and S. O. with verbalization of understanding of pt and S.O.     1445 PT states 'safe to ambulate with walker and use walker at home' however needs 'heavy duty walker due to weight' and Zabrina Recinos will contact  and aware that I called [facility] '[address] [place] [address]' [phone] and states 'will bring wheeled heavy duty walker as soon as possible' with pt and S. O. with verbalization of understanding of both pt and S.O.    1415 Zabrina Recinos here from PT to evaluate and treat for use of walker    1400 Drsg to R foot D&I with <3sec cap refill and +2palp popliteal pulse. Spoke to [facility] from PT concerning order for pt to have walker with PT to evaluate and treat for using walker with no weight bearing on right foot.

## 2021-10-27 NOTE — H&P
Lennox Urbina    [id number]    [facility] [employer] Same Day Surgery Update H & P  Department of General Surgery   Surgical Service   Pre-operative History and Physical  Last H & P within the last 30 days. DIAGNOSIS:   Hallux abducto valgus, right [M20.11]    Procedure(s):  LAPIDUS ARTHRODESIS RIGHT FOOT     History obtained from: Patient interview and EHR     HISTORY OF PRESENT ILLNESS:   Patient with chronic right foot pain and valgus deformity of the 1st MTP joint. The symptoms have been recalcitrant to conservative treatment and the patient presents today for the above procedure. Covid 19:  Patient denies fever, chills, worsening cough, or known exposure to Covid-19. Past Medical History:        Diagnosis Date    Arthritis     Asthma due to environmental allergies     Bipolar 2 disorder ([facility]     Depression     Environmental allergies     Hypertension     unspecified    Primary osteoarthritis of right knee 5/31/2018    PTSD (post-traumatic stress disorder)     Shoulder instability     left     Past Surgical History:        Procedure Laterality Date    CYST REMOVAL      DILATION AND CURETTAGE OF UTERUS      KNEE SURGERY      LEFT KNEE    KNEE SURGERY      OTHER SURGICAL HISTORY  01/19/2017    LEFT SHOULDER ARTHROSCOPY LABRAL DEBRIDEMENT SUBACROMIAL    LA KNEE SCOPE,MED OR LAT MENIS REPAIR Right 11/1/2018    RIGHT KNEE ARTHROSCOPY, MEDIAL MENISCECTOMY, SYNOVECTOMY, CHONDROPLASTY performed by Opal Stahl [facility] at [facility]      WRIST SURGERY      RIGHT GANGLION     Past Social History:  Social History     Socioeconomic History    Marital status:      Spouse name: None    Number of children: None    Years of education: None    Highest education level: None   Occupational History    None   Tobacco Use    Smoking status: Never Smoker    Smokeless tobacco: Never Used   Substance and Sexual Activity    Alcohol use:  No Alcohol/week: 0.0 standard drinks     Comment: VERY RARE    Drug use: No    Sexual activity: None   Other Topics Concern    None   Social History Narrative    ** Merged History Encounter **          Social Determinants of Health     Financial Resource Strain:     Difficulty of Paying Living Expenses:    Food Insecurity:     Worried About Running Out of Food in the Last Year:     Ran Out of Food in the Last Year:    Transportation Needs:     Lack of Transportation (Medical):  Lack of Transportation (Non-Medical):    Physical Activity:     Days of Exercise per Week:     Minutes of Exercise per Session:    Stress:     Feeling of Stress :    Social Connections:     Frequency of Communication with Friends and Family:     Frequency of Social Gatherings with Friends and Family:     Attends Holiness Services:     Active Member of Clubs or Organizations:     Attends Club or Organization Meetings:     Marital Status:    Intimate Partner Violence:     Fear of Current or Ex-Partner:     Emotionally Abused:     Physically Abused:     Sexually Abused:          Medications Prior to Admission:      Prior to Admission medications    Medication Sig Start Date End Date Taking? Authorizing Provider   cyclobenzaprine (FLEXERIL) 10 MG tablet Take 1 tablet by mouth nightly as needed for Muscle spasms 12/30/19   Thai Redding MD   gabapentin (NEURONTIN) 300 MG capsule Take 1 capsule by mouth 2 times daily for 60 days.  Intended supply: 90 days 12/30/19 2/28/20  Thai Redding MD   meloxicam [facility]) 15 MG tablet Take 1 tablet by mouth daily 12/12/19   Thai Redding MD   ibuprofen (IBU) 800 MG tablet 1 po q up to 3 times daily 12/27/18   Josh Abdullahi MD   QUEtiapine (SEROQUEL) 25 MG tablet Take 25 mg by mouth 2 times daily    Historical Provider, MD   PARoxetine (PAXIL) 10 MG tablet  9/28/17   Historical Provider, MD   loratadine (CLARITIN) 10 MG capsule Take 10 mg by mouth

## 2021-10-27 NOTE — FLOWSHEET NOTE
/72. OR 09 still connected to pt and is documenting VS in patient chart. Pt currently in PACU 18 with stable VS. OR 09 called and spoke to RN who said \"will handle it\".

## 2021-10-27 NOTE — FLOWSHEET NOTE
10/27/21 0801   Encounter Summary   Services provided to: Patient   Referral/Consult From: [place]   Continue Visiting   (10/27, maverick )   Complexity of Encounter Moderate   Length of Encounter 15 minutes   Routine   Type Pre-procedure   Assessment Calm; Approachable; Hopeful   Intervention Active listening;Explored feelings, thoughts, concerns;Nurtured hope   Outcome Comfort;Expressed gratitude   Staff Lisseth [place], [place]

## 2023-01-05 NOTE — FLOWSHEET NOTE
Physical Therapy  Cancellation/No-show Note  Patient Name:  Rachna Reynolds  :  1975   Date:  1/15/2020  Cancelled visits to date: 01  No-shows to date: 0    For today's appointment patient:  [x]  Cancelled  []  Rescheduled appointment  []  No-show     Reason given by patient:  []  Patient ill  []  Conflicting appointment  []  No transportation    []  Conflict with work  []  No reason given  [x]  Other:     Comments:  Does not want to stay due to insurance benefits.       Electronically signed by:  Jose Luis Talley PT Preceptor Review: I saw and evaluated patient. I discussed case with resident. I agree with the resident's findings and plan, as documented in today's note. Kate Grimm M.D.   Anticipated Starting Dosage (Optional): 40mg BID Detail Level: Zone

## (undated) DEVICE — SOLUTION IRRIG 3000ML LAC R FLX CONT

## (undated) DEVICE — SUTURE ETHLN SZ 4-0 L18IN NONABSORBABLE BLK L19MM PS-2 3/8 1667H

## (undated) DEVICE — ZIMMER® STERILE DISPOSABLE TOURNIQUET CUFF WITH PLC, DUAL PORT, SINGLE BLADDER, 30 IN. (76 CM)

## (undated) DEVICE — COVER LT HNDL BLU PLAS

## (undated) DEVICE — SOLUTION IV 1000ML 0.9% SOD CHL

## (undated) DEVICE — BLADE SAW 40X11X0.38 MM REPL 8.7 CM TEETH LAPIPLASTY

## (undated) DEVICE — CHLORAPREP 26ML ORANGE

## (undated) DEVICE — TOWEL,STOP FLAG GOLD N-W: Brand: MEDLINE

## (undated) DEVICE — BLADE SHV L13CM DIA4MM EXCALIBUR AGG COOLCUT

## (undated) DEVICE — STANDARD HYPODERMIC NEEDLE,POLYPROPYLENE HUB: Brand: MONOJECT

## (undated) DEVICE — SURE SET-DOUBLE BASIN-LF: Brand: MEDLINE INDUSTRIES, INC.

## (undated) DEVICE — SKIN AFFIX SURG ADHESIVE 72/CS 0.55ML: Brand: MEDLINE

## (undated) DEVICE — PAD DRY FLOOR ABS 32X58IN GRN

## (undated) DEVICE — PACK PROCEDURE SURG ARTHROSCOPY

## (undated) DEVICE — GOWN,SIRUS,POLYRNF,SETINSLV,XL,20/CS: Brand: MEDLINE

## (undated) DEVICE — PRECISION THIN (5.5 X 0.38 X 11.5MM)

## (undated) DEVICE — BANDAGE COBAN 4 IN COMPR W4INXL5YD FOAM COHESIVE QUIK STK SELF ADH SFT

## (undated) DEVICE — TURNOVER KIT RM INF CTRL TECH

## (undated) DEVICE — SUTURE VCRL SZ 3-0 L27IN ABSRB UD L26MM CT-2 1/2 CIR J232H

## (undated) DEVICE — PAD,NON-ADHERENT,3X8,STERILE,LF,1/PK: Brand: MEDLINE

## (undated) DEVICE — 3M™ STERI-DRAPE™ U-DRAPE 1015: Brand: STERI-DRAPE™

## (undated) DEVICE — GLOVE ORANGE PI 7 1/2   MSG9075

## (undated) DEVICE — PRECISION THIN (7.0 X 0.38 X 29.5MM)

## (undated) DEVICE — C-ARM: Brand: UNBRANDED

## (undated) DEVICE — PODIATRY: Brand: MEDLINE INDUSTRIES, INC.

## (undated) DEVICE — CANNULA NSL AD TBNG L7FT PVC STR NONFLARED PRNG O2 DEL W STD

## (undated) DEVICE — MASTISOL ADHESIVE LIQ 2/3ML

## (undated) DEVICE — SUTURE MCRYL SZ 4-0 L27IN ABSRB UD L19MM PS-2 1/2 CIR PRIM Y426H

## (undated) DEVICE — TUBING PMP L16FT MAIN DISP FOR AR-6400 AR-6475

## (undated) DEVICE — COVER,MAYO STAND,XL,STERILE: Brand: MEDLINE

## (undated) DEVICE — SUTURE VCRL SZ 4-0 L27IN ABSRB UD L26MM SH 1/2 CIR J415H

## (undated) DEVICE — UNDERGLOVE SURG SZ 8 BLU LTX FREE SYN POLYISOPRENE POLYMER

## (undated) DEVICE — GOWN,SIRUS,POLYRNF,BRTHSLV,XLN/XXL,18/CS: Brand: MEDLINE

## (undated) DEVICE — SUTURE COAT VCRL SZ 4-0 L18IN ABSRB UD L19MM PS-2 1/2 CIR J496G

## (undated) DEVICE — BLADE SHV L13CM DIA4MM TAPR TIP SCIS LIKE CUT OVL OUTER

## (undated) DEVICE — ELECTRODE PT RET AD L9FT HI MOIST COND ADH HYDRGEL CORDED

## (undated) DEVICE — STERILE PVP: Brand: MEDLINE INDUSTRIES, INC.

## (undated) DEVICE — STERILE POLYISOPRENE POWDER-FREE SURGICAL GLOVES WITH EMOLLIENT COATING: Brand: PROTEXIS

## (undated) DEVICE — FOOT SWITCH DRAPE: Brand: UNBRANDED

## (undated) DEVICE — TUBING PMP L6FT CONT WAVE EXTN

## (undated) DEVICE — GLOVE SURG SZ 9 L1185IN FNGR THK75MIL STRW LTX POLYMER BEAD

## (undated) DEVICE — TUBING FLD MGMT Y DBL SPIK DUALWAVE

## (undated) DEVICE — STRIP SKIN CLSR W0.25XL4IN WHT SPUNBOUND FBR NYL HI ADH

## (undated) DEVICE — SYRINGE MED 10ML LUERLOCK TIP W/O SFTY DISP

## (undated) DEVICE — GAUZE,SPONGE,4"X4",16PLY,XRAY,STRL,LF: Brand: MEDLINE